# Patient Record
Sex: MALE | Race: WHITE | NOT HISPANIC OR LATINO | Employment: OTHER | URBAN - NONMETROPOLITAN AREA
[De-identification: names, ages, dates, MRNs, and addresses within clinical notes are randomized per-mention and may not be internally consistent; named-entity substitution may affect disease eponyms.]

---

## 2018-02-14 ENCOUNTER — HISTORICAL (OUTPATIENT)
Dept: ADMINISTRATIVE | Facility: HOSPITAL | Age: 83
End: 2018-02-14

## 2018-07-13 ENCOUNTER — HISTORICAL (OUTPATIENT)
Dept: ADMINISTRATIVE | Facility: HOSPITAL | Age: 83
End: 2018-07-13

## 2018-07-14 ENCOUNTER — HISTORICAL (OUTPATIENT)
Dept: ADMINISTRATIVE | Facility: HOSPITAL | Age: 83
End: 2018-07-14

## 2018-07-16 ENCOUNTER — HISTORICAL (OUTPATIENT)
Dept: ADMINISTRATIVE | Facility: HOSPITAL | Age: 83
End: 2018-07-16

## 2018-08-09 ENCOUNTER — HISTORICAL (OUTPATIENT)
Dept: ADMINISTRATIVE | Facility: HOSPITAL | Age: 83
End: 2018-08-09

## 2019-05-02 ENCOUNTER — HISTORICAL (OUTPATIENT)
Dept: ADMINISTRATIVE | Facility: HOSPITAL | Age: 84
End: 2019-05-02

## 2019-08-21 ENCOUNTER — HISTORICAL (OUTPATIENT)
Dept: ADMINISTRATIVE | Facility: HOSPITAL | Age: 84
End: 2019-08-21

## 2019-11-12 ENCOUNTER — HISTORICAL (OUTPATIENT)
Dept: ADMINISTRATIVE | Facility: HOSPITAL | Age: 84
End: 2019-11-12

## 2022-01-01 ENCOUNTER — HISTORICAL (OUTPATIENT)
Dept: ADMINISTRATIVE | Facility: HOSPITAL | Age: 87
End: 2022-01-01

## 2023-02-16 ENCOUNTER — OFFICE VISIT (OUTPATIENT)
Dept: FAMILY MEDICINE | Facility: CLINIC | Age: 88
End: 2023-02-16
Payer: COMMERCIAL

## 2023-02-16 VITALS
OXYGEN SATURATION: 98 % | TEMPERATURE: 97 F | SYSTOLIC BLOOD PRESSURE: 136 MMHG | HEIGHT: 72 IN | WEIGHT: 180 LBS | HEART RATE: 83 BPM | DIASTOLIC BLOOD PRESSURE: 86 MMHG | BODY MASS INDEX: 24.38 KG/M2

## 2023-02-16 DIAGNOSIS — J01.40 ACUTE NON-RECURRENT PANSINUSITIS: Primary | ICD-10-CM

## 2023-02-16 PROCEDURE — 3288F PR FALLS RISK ASSESSMENT DOCUMENTED: ICD-10-PCS | Mod: CPTII,,, | Performed by: NURSE PRACTITIONER

## 2023-02-16 PROCEDURE — 1159F PR MEDICATION LIST DOCUMENTED IN MEDICAL RECORD: ICD-10-PCS | Mod: CPTII,,, | Performed by: NURSE PRACTITIONER

## 2023-02-16 PROCEDURE — 3288F FALL RISK ASSESSMENT DOCD: CPT | Mod: CPTII,,, | Performed by: NURSE PRACTITIONER

## 2023-02-16 PROCEDURE — 1101F PT FALLS ASSESS-DOCD LE1/YR: CPT | Mod: CPTII,,, | Performed by: NURSE PRACTITIONER

## 2023-02-16 PROCEDURE — 99202 OFFICE O/P NEW SF 15 MIN: CPT | Mod: ,,, | Performed by: NURSE PRACTITIONER

## 2023-02-16 PROCEDURE — 1160F PR REVIEW ALL MEDS BY PRESCRIBER/CLIN PHARMACIST DOCUMENTED: ICD-10-PCS | Mod: CPTII,,, | Performed by: NURSE PRACTITIONER

## 2023-02-16 PROCEDURE — 99999 PR PBB SHADOW E&M-EST. PATIENT-LVL IV: CPT | Mod: PBBFAC,,, | Performed by: NURSE PRACTITIONER

## 2023-02-16 PROCEDURE — 99202 PR OFFICE/OUTPT VISIT, NEW, LEVL II, 15-29 MIN: ICD-10-PCS | Mod: ,,, | Performed by: NURSE PRACTITIONER

## 2023-02-16 PROCEDURE — 1160F RVW MEDS BY RX/DR IN RCRD: CPT | Mod: CPTII,,, | Performed by: NURSE PRACTITIONER

## 2023-02-16 PROCEDURE — 1159F MED LIST DOCD IN RCRD: CPT | Mod: CPTII,,, | Performed by: NURSE PRACTITIONER

## 2023-02-16 PROCEDURE — 99999 PR PBB SHADOW E&M-EST. PATIENT-LVL IV: ICD-10-PCS | Mod: PBBFAC,,, | Performed by: NURSE PRACTITIONER

## 2023-02-16 PROCEDURE — 1101F PR PT FALLS ASSESS DOC 0-1 FALLS W/OUT INJ PAST YR: ICD-10-PCS | Mod: CPTII,,, | Performed by: NURSE PRACTITIONER

## 2023-02-16 RX ORDER — FUROSEMIDE 20 MG/1
20 TABLET ORAL 2 TIMES DAILY
COMMUNITY
Start: 2022-12-30

## 2023-02-16 RX ORDER — SACUBITRIL AND VALSARTAN 97; 103 MG/1; MG/1
1 TABLET, FILM COATED ORAL 2 TIMES DAILY
COMMUNITY
Start: 2023-02-15

## 2023-02-16 RX ORDER — ATORVASTATIN CALCIUM 40 MG/1
40 TABLET, FILM COATED ORAL DAILY
COMMUNITY
Start: 2022-11-29

## 2023-02-16 RX ORDER — METOPROLOL SUCCINATE 50 MG/1
50 TABLET, EXTENDED RELEASE ORAL DAILY
COMMUNITY
Start: 2022-12-30

## 2023-02-16 RX ORDER — ALLOPURINOL 100 MG/1
100 TABLET ORAL DAILY
COMMUNITY
Start: 2023-01-12

## 2023-02-16 RX ORDER — AMOXICILLIN AND CLAVULANATE POTASSIUM 875; 125 MG/1; MG/1
1 TABLET, FILM COATED ORAL 2 TIMES DAILY
COMMUNITY
Start: 2023-02-13 | End: 2024-03-21 | Stop reason: ALTCHOICE

## 2023-02-16 RX ORDER — METHIMAZOLE 5 MG/1
5 TABLET ORAL DAILY
COMMUNITY
Start: 2022-12-19

## 2023-02-21 PROBLEM — J01.40 ACUTE NON-RECURRENT PANSINUSITIS: Status: ACTIVE | Noted: 2023-02-21

## 2023-02-22 NOTE — PROGRESS NOTES
Subjective:       Patient ID: Luis Felipe Puri is a 87 y.o. male.    Chief Complaint: Sinusitis    Sinusitis  This is a new problem. The current episode started in the past 7 days. The problem has been gradually worsening since onset. There has been no fever. He is experiencing no pain. Associated symptoms include congestion and sinus pressure. Pertinent negatives include no ear pain, headaches, sore throat or swollen glands. Past treatments include nasal decongestants. The treatment provided no relief.   Review of Systems   HENT:  Positive for nasal congestion and sinus pressure/congestion. Negative for ear pain and sore throat.    Neurological:  Negative for headaches.    See HPI    Objective:      Physical Exam   Constitutional: He is oriented to person, place, and time.  Non-toxic appearance.   HENT:   Head: Normocephalic and atraumatic.   Right Ear: Tympanic membrane, external ear and ear canal normal. No drainage or swelling. Tympanic membrane is not injected and not erythematous.   Left Ear: Tympanic membrane, external ear and ear canal normal. No drainage or swelling. Tympanic membrane is not injected and not erythematous.   Nose: Rhinorrhea and congestion present. Right sinus exhibits maxillary sinus tenderness and frontal sinus tenderness. Left sinus exhibits maxillary sinus tenderness and frontal sinus tenderness.   Mouth/Throat: Mucous membranes are moist. Oropharynx is clear.   Eyes: Pupils are equal, round, and reactive to light. Conjunctivae are normal.   Cardiovascular: Normal rate, regular rhythm and normal heart sounds.   Pulmonary/Chest: Effort normal and breath sounds normal.   Abdominal: Normal appearance.   Musculoskeletal:         General: Normal range of motion.      Cervical back: Normal range of motion and neck supple.   Neurological: He is alert and oriented to person, place, and time.   Skin: Skin is warm and dry.   Psychiatric: His behavior is normal. Mood, judgment and thought  content normal.   Nursing note and vitals reviewed.    Vitals:    02/16/23 1314   BP: 136/86   Pulse: 83   Temp: 97.3 °F (36.3 °C)       Assessment/Plan:     1. Acute non-recurrent pansinusitis    Discussed pros and cons of decongestants. Pt verbalized understanding.   Rx decongestant Ala-Hist sample x3 days.  Drink plenty of fluids.  RTC prn.

## 2023-05-29 PROBLEM — J01.40 ACUTE NON-RECURRENT PANSINUSITIS: Status: RESOLVED | Noted: 2023-02-21 | Resolved: 2023-05-29

## 2024-03-21 ENCOUNTER — HOSPITAL ENCOUNTER (INPATIENT)
Facility: HOSPITAL | Age: 89
LOS: 1 days | Discharge: HOME | DRG: 351 | End: 2024-03-23
Attending: EMERGENCY MEDICINE | Admitting: FAMILY MEDICINE
Payer: MEDICARE

## 2024-03-21 DIAGNOSIS — K56.609 SMALL BOWEL OBSTRUCTION: ICD-10-CM

## 2024-03-21 DIAGNOSIS — I50.9 CHF (CONGESTIVE HEART FAILURE): ICD-10-CM

## 2024-03-21 DIAGNOSIS — I42.9 CARDIOMYOPATHY: ICD-10-CM

## 2024-03-21 DIAGNOSIS — R07.9 CHEST PAIN: ICD-10-CM

## 2024-03-21 DIAGNOSIS — K40.90 RIGHT INGUINAL HERNIA: ICD-10-CM

## 2024-03-21 DIAGNOSIS — K40.30 UNILATERAL INGUINAL HERNIA WITH OBSTRUCTION AND WITHOUT GANGRENE, RECURRENCE NOT SPECIFIED: Primary | ICD-10-CM

## 2024-03-21 PROBLEM — I25.10 CORONARY ARTERY DISEASE: Status: ACTIVE | Noted: 2024-03-21

## 2024-03-21 PROBLEM — I25.5 ISCHEMIC CARDIOMYOPATHY: Status: ACTIVE | Noted: 2024-03-21

## 2024-03-21 PROBLEM — E05.90 HYPERTHYROIDISM: Status: ACTIVE | Noted: 2024-03-21

## 2024-03-21 PROBLEM — N17.9 ACUTE KIDNEY INJURY: Status: ACTIVE | Noted: 2024-03-21

## 2024-03-21 PROBLEM — E78.5 HYPERLIPIDEMIA: Status: ACTIVE | Noted: 2024-03-21

## 2024-03-21 PROBLEM — R73.9 HYPERGLYCEMIA: Status: ACTIVE | Noted: 2024-03-21

## 2024-03-21 PROBLEM — M10.9 GOUT: Status: ACTIVE | Noted: 2024-03-21

## 2024-03-21 PROBLEM — I16.0 HYPERTENSIVE URGENCY: Status: ACTIVE | Noted: 2024-03-21

## 2024-03-21 LAB
ALBUMIN SERPL-MCNC: 3.8 G/DL (ref 3.4–5)
ALBUMIN/GLOB SERPL: 1.2 RATIO
ALP SERPL-CCNC: 161 UNIT/L (ref 50–144)
ALT SERPL-CCNC: 41 UNIT/L (ref 1–45)
ANION GAP SERPL CALC-SCNC: 6 MEQ/L (ref 2–13)
APPEARANCE UR: CLEAR
AST SERPL-CCNC: 31 UNIT/L (ref 17–59)
BASOPHILS # BLD AUTO: 0.02 X10(3)/MCL (ref 0.01–0.08)
BASOPHILS NFR BLD AUTO: 0.3 % (ref 0.1–1.2)
BILIRUB SERPL-MCNC: 0.8 MG/DL (ref 0–1)
BILIRUB UR QL STRIP.AUTO: NEGATIVE
BUN SERPL-MCNC: 46 MG/DL (ref 7–20)
CALCIUM SERPL-MCNC: 9.3 MG/DL (ref 8.4–10.2)
CHLORIDE SERPL-SCNC: 110 MMOL/L (ref 98–110)
CO2 SERPL-SCNC: 23 MMOL/L (ref 21–32)
COLOR UR AUTO: YELLOW
CREAT SERPL-MCNC: 1.33 MG/DL (ref 0.66–1.25)
CREAT/UREA NIT SERPL: 35 (ref 12–20)
EOSINOPHIL # BLD AUTO: 0.06 X10(3)/MCL (ref 0.04–0.54)
EOSINOPHIL NFR BLD AUTO: 1 % (ref 0.7–7)
ERYTHROCYTE [DISTWIDTH] IN BLOOD BY AUTOMATED COUNT: 12.6 %
EST. AVERAGE GLUCOSE BLD GHB EST-MCNC: 191.5 MG/DL (ref 70–115)
GFR SERPLBLD CREATININE-BSD FMLA CKD-EPI: 51 MLS/MIN/1.73/M2
GLOBULIN SER-MCNC: 3.3 GM/DL (ref 2–3.9)
GLUCOSE SERPL-MCNC: 237 MG/DL (ref 70–115)
GLUCOSE UR QL STRIP.AUTO: 100
HBA1C MFR BLD: 8.3 % (ref 4–6)
HCT VFR BLD AUTO: 40 % (ref 36–52)
HGB BLD-MCNC: 13.1 G/DL (ref 13–18)
HYALINE CASTS URNS QL MICRO: ABNORMAL /LPF
IMM GRANULOCYTES # BLD AUTO: 0.01 X10(3)/MCL (ref 0–0.03)
IMM GRANULOCYTES NFR BLD AUTO: 0.2 % (ref 0–0.5)
KETONES UR QL STRIP.AUTO: NEGATIVE
LACTATE SERPL-SCNC: 1.2 MMOL/L (ref 0.4–2)
LEUKOCYTE ESTERASE UR QL STRIP.AUTO: NEGATIVE
LYMPHOCYTES # BLD AUTO: 1.09 X10(3)/MCL (ref 1.32–3.57)
LYMPHOCYTES NFR BLD AUTO: 18.9 % (ref 20–55)
MCH RBC QN AUTO: 30.1 PG (ref 27–34)
MCHC RBC AUTO-ENTMCNC: 32.8 G/DL (ref 31–37)
MCV RBC AUTO: 92 FL (ref 79–99)
MONOCYTES # BLD AUTO: 0.41 X10(3)/MCL (ref 0.3–0.82)
MONOCYTES NFR BLD AUTO: 7.1 % (ref 4.7–12.5)
NEUTROPHILS # BLD AUTO: 4.17 X10(3)/MCL (ref 1.78–5.38)
NEUTROPHILS NFR BLD AUTO: 72.5 % (ref 37–73)
NITRITE UR QL STRIP.AUTO: NEGATIVE
NRBC BLD AUTO-RTO: 0 %
PH UR STRIP.AUTO: 7 [PH]
PLATELET # BLD AUTO: 152 X10(3)/MCL (ref 140–371)
PMV BLD AUTO: 10.5 FL (ref 9.4–12.4)
POCT GLUCOSE: 139 MG/DL (ref 70–110)
POTASSIUM SERPL-SCNC: 4.6 MMOL/L (ref 3.5–5.1)
PROT SERPL-MCNC: 7.1 GM/DL (ref 6.3–8.2)
PROT UR QL STRIP.AUTO: 100
RBC # BLD AUTO: 4.35 X10(6)/MCL (ref 4–6)
RBC #/AREA URNS AUTO: ABNORMAL /HPF
RBC UR QL AUTO: ABNORMAL
SODIUM SERPL-SCNC: 139 MMOL/L (ref 135–145)
SP GR UR STRIP.AUTO: 1.01 (ref 1–1.03)
SQUAMOUS #/AREA URNS AUTO: ABNORMAL /HPF
UROBILINOGEN UR STRIP-ACNC: 0.2
WBC # SPEC AUTO: 5.76 X10(3)/MCL (ref 4–11.5)

## 2024-03-21 PROCEDURE — 96360 HYDRATION IV INFUSION INIT: CPT

## 2024-03-21 PROCEDURE — 83036 HEMOGLOBIN GLYCOSYLATED A1C: CPT | Performed by: INTERNAL MEDICINE

## 2024-03-21 PROCEDURE — 25500020 PHARM REV CODE 255: Performed by: EMERGENCY MEDICINE

## 2024-03-21 PROCEDURE — 83605 ASSAY OF LACTIC ACID: CPT | Performed by: EMERGENCY MEDICINE

## 2024-03-21 PROCEDURE — 80053 COMPREHEN METABOLIC PANEL: CPT | Performed by: EMERGENCY MEDICINE

## 2024-03-21 PROCEDURE — G0378 HOSPITAL OBSERVATION PER HR: HCPCS

## 2024-03-21 PROCEDURE — 99285 EMERGENCY DEPT VISIT HI MDM: CPT | Mod: 25

## 2024-03-21 PROCEDURE — 81001 URINALYSIS AUTO W/SCOPE: CPT | Performed by: EMERGENCY MEDICINE

## 2024-03-21 PROCEDURE — 25000003 PHARM REV CODE 250: Performed by: EMERGENCY MEDICINE

## 2024-03-21 PROCEDURE — 96372 THER/PROPH/DIAG INJ SC/IM: CPT | Performed by: INTERNAL MEDICINE

## 2024-03-21 PROCEDURE — 85025 COMPLETE CBC W/AUTO DIFF WBC: CPT | Performed by: EMERGENCY MEDICINE

## 2024-03-21 PROCEDURE — 82962 GLUCOSE BLOOD TEST: CPT

## 2024-03-21 PROCEDURE — 63600175 PHARM REV CODE 636 W HCPCS: Performed by: INTERNAL MEDICINE

## 2024-03-21 PROCEDURE — 25000003 PHARM REV CODE 250: Performed by: INTERNAL MEDICINE

## 2024-03-21 PROCEDURE — 96361 HYDRATE IV INFUSION ADD-ON: CPT

## 2024-03-21 RX ORDER — GLUCAGON 1 MG
1 KIT INJECTION
Status: DISCONTINUED | OUTPATIENT
Start: 2024-03-21 | End: 2024-03-23 | Stop reason: HOSPADM

## 2024-03-21 RX ORDER — FUROSEMIDE 20 MG/1
20 TABLET ORAL 2 TIMES DAILY
Status: DISCONTINUED | OUTPATIENT
Start: 2024-03-21 | End: 2024-03-23 | Stop reason: HOSPADM

## 2024-03-21 RX ORDER — INSULIN ASPART 100 [IU]/ML
0-5 INJECTION, SOLUTION INTRAVENOUS; SUBCUTANEOUS
Status: DISCONTINUED | OUTPATIENT
Start: 2024-03-21 | End: 2024-03-23 | Stop reason: HOSPADM

## 2024-03-21 RX ORDER — NALOXONE HCL 0.4 MG/ML
0.02 VIAL (ML) INJECTION
Status: DISCONTINUED | OUTPATIENT
Start: 2024-03-21 | End: 2024-03-23 | Stop reason: HOSPADM

## 2024-03-21 RX ORDER — IBUPROFEN 200 MG
24 TABLET ORAL
Status: DISCONTINUED | OUTPATIENT
Start: 2024-03-21 | End: 2024-03-23 | Stop reason: HOSPADM

## 2024-03-21 RX ORDER — TALC
6 POWDER (GRAM) TOPICAL NIGHTLY PRN
Status: DISCONTINUED | OUTPATIENT
Start: 2024-03-21 | End: 2024-03-23 | Stop reason: HOSPADM

## 2024-03-21 RX ORDER — ENOXAPARIN SODIUM 100 MG/ML
40 INJECTION SUBCUTANEOUS EVERY 24 HOURS
Status: DISCONTINUED | OUTPATIENT
Start: 2024-03-21 | End: 2024-03-23 | Stop reason: HOSPADM

## 2024-03-21 RX ORDER — ALLOPURINOL 100 MG/1
100 TABLET ORAL DAILY
Status: DISCONTINUED | OUTPATIENT
Start: 2024-03-22 | End: 2024-03-23 | Stop reason: HOSPADM

## 2024-03-21 RX ORDER — SODIUM CHLORIDE 9 MG/ML
1000 INJECTION, SOLUTION INTRAVENOUS CONTINUOUS
Status: DISCONTINUED | OUTPATIENT
Start: 2024-03-21 | End: 2024-03-21

## 2024-03-21 RX ORDER — SODIUM CHLORIDE 0.9 % (FLUSH) 0.9 %
10 SYRINGE (ML) INJECTION EVERY 12 HOURS PRN
Status: DISCONTINUED | OUTPATIENT
Start: 2024-03-21 | End: 2024-03-23 | Stop reason: HOSPADM

## 2024-03-21 RX ORDER — PROMETHAZINE HYDROCHLORIDE 25 MG/1
25 TABLET ORAL EVERY 6 HOURS PRN
Status: DISCONTINUED | OUTPATIENT
Start: 2024-03-21 | End: 2024-03-23 | Stop reason: HOSPADM

## 2024-03-21 RX ORDER — SODIUM CHLORIDE 9 MG/ML
1000 INJECTION, SOLUTION INTRAVENOUS CONTINUOUS
Status: ACTIVE | OUTPATIENT
Start: 2024-03-21 | End: 2024-03-22

## 2024-03-21 RX ORDER — ALUMINUM HYDROXIDE, MAGNESIUM HYDROXIDE, AND SIMETHICONE 1200; 120; 1200 MG/30ML; MG/30ML; MG/30ML
30 SUSPENSION ORAL 4 TIMES DAILY PRN
Status: DISCONTINUED | OUTPATIENT
Start: 2024-03-21 | End: 2024-03-23 | Stop reason: HOSPADM

## 2024-03-21 RX ORDER — ONDANSETRON HYDROCHLORIDE 2 MG/ML
4 INJECTION, SOLUTION INTRAVENOUS EVERY 8 HOURS PRN
Status: DISCONTINUED | OUTPATIENT
Start: 2024-03-21 | End: 2024-03-23 | Stop reason: HOSPADM

## 2024-03-21 RX ORDER — BISACODYL 10 MG/1
10 SUPPOSITORY RECTAL DAILY PRN
Status: DISCONTINUED | OUTPATIENT
Start: 2024-03-21 | End: 2024-03-23 | Stop reason: HOSPADM

## 2024-03-21 RX ORDER — METOPROLOL SUCCINATE 50 MG/1
50 TABLET, EXTENDED RELEASE ORAL DAILY
Status: DISCONTINUED | OUTPATIENT
Start: 2024-03-22 | End: 2024-03-23 | Stop reason: HOSPADM

## 2024-03-21 RX ORDER — METHIMAZOLE 5 MG/1
5 TABLET ORAL DAILY
Status: DISCONTINUED | OUTPATIENT
Start: 2024-03-22 | End: 2024-03-23 | Stop reason: HOSPADM

## 2024-03-21 RX ORDER — HYDROCODONE BITARTRATE AND ACETAMINOPHEN 5; 325 MG/1; MG/1
1 TABLET ORAL EVERY 6 HOURS PRN
Status: DISCONTINUED | OUTPATIENT
Start: 2024-03-21 | End: 2024-03-23 | Stop reason: HOSPADM

## 2024-03-21 RX ORDER — IBUPROFEN 200 MG
16 TABLET ORAL
Status: DISCONTINUED | OUTPATIENT
Start: 2024-03-21 | End: 2024-03-23 | Stop reason: HOSPADM

## 2024-03-21 RX ORDER — ATORVASTATIN CALCIUM 40 MG/1
40 TABLET, FILM COATED ORAL DAILY
Status: DISCONTINUED | OUTPATIENT
Start: 2024-03-22 | End: 2024-03-23 | Stop reason: HOSPADM

## 2024-03-21 RX ORDER — ACETAMINOPHEN 325 MG/1
650 TABLET ORAL EVERY 8 HOURS PRN
Status: DISCONTINUED | OUTPATIENT
Start: 2024-03-21 | End: 2024-03-23 | Stop reason: HOSPADM

## 2024-03-21 RX ADMIN — SODIUM CHLORIDE 1000 ML: 9 INJECTION, SOLUTION INTRAVENOUS at 03:03

## 2024-03-21 RX ADMIN — SACUBITRIL AND VALSARTAN 1 TABLET: 97; 103 TABLET, FILM COATED ORAL at 08:03

## 2024-03-21 RX ADMIN — SODIUM CHLORIDE 1000 ML: 9 INJECTION, SOLUTION INTRAVENOUS at 04:03

## 2024-03-21 RX ADMIN — IOHEXOL 93 ML: 300 INJECTION, SOLUTION INTRAVENOUS at 12:03

## 2024-03-21 RX ADMIN — SODIUM CHLORIDE 500 ML: 9 INJECTION, SOLUTION INTRAVENOUS at 12:03

## 2024-03-21 RX ADMIN — FUROSEMIDE 20 MG: 20 TABLET ORAL at 08:03

## 2024-03-21 RX ADMIN — ENOXAPARIN SODIUM 40 MG: 100 INJECTION SUBCUTANEOUS at 05:03

## 2024-03-21 NOTE — SUBJECTIVE & OBJECTIVE
Past Medical History:   Diagnosis Date    Anxiety     Bilateral inguinal hernia     CHF (congestive heart failure)     Coronary artery disease     Gout     Hyperlipidemia     Hypertension     Hyperthyroidism     Ischemic cardiomyopathy     Thyroid nodule        Past Surgical History:   Procedure Laterality Date    CHOLECYSTECTOMY      1990    CORONARY ANGIOPLASTY WITH STENT PLACEMENT      INGUINAL HERNIA REPAIR Left     KNEE ARTHROSCOPY Right     8/10/2018    RIGHT GREAT TOE AMPUTATION         Review of patient's allergies indicates:  No Known Allergies    No current facility-administered medications on file prior to encounter.     Current Outpatient Medications on File Prior to Encounter   Medication Sig    allopurinoL (ZYLOPRIM) 100 MG tablet Take 100 mg by mouth once daily.    atorvastatin (LIPITOR) 40 MG tablet Take 40 mg by mouth once daily.    ENTRESTO  mg per tablet Take 1 tablet by mouth 2 (two) times daily.    furosemide (LASIX) 20 MG tablet Take 20 mg by mouth 2 (two) times daily.    methIMAzole (TAPAZOLE) 5 MG Tab Take 5 mg by mouth once daily.    metoprolol succinate (TOPROL-XL) 50 MG 24 hr tablet Take 50 mg by mouth once daily.    [DISCONTINUED] amoxicillin-clavulanate 875-125mg (AUGMENTIN) 875-125 mg per tablet Take 1 tablet by mouth 2 (two) times daily.     Family History       Problem Relation (Age of Onset)    Cancer Mother    Ovarian cancer Sister    Pancreatic cancer Brother        Social History:   Tobacco Use    Smoking status: Never    Smokeless tobacco: Never   Substance and Sexual Activity    Alcohol use: Never    Drug use: Never    Sexual activity: Not on file     Review of Systems   Constitutional: Negative.    HENT: Negative.     Eyes: Negative.    Respiratory: Negative.     Cardiovascular: Negative.    Gastrointestinal:  Positive for nausea.   Endocrine: Negative.    Genitourinary: Negative.    Musculoskeletal: Negative.    Skin: Negative.    Allergic/Immunologic: Negative.     Neurological: Negative.    Hematological: Negative.    Psychiatric/Behavioral: Negative.       Objective:     Vital Signs (Most Recent):  Temp: 97.8 °F (36.6 °C) (03/21/24 1442)  Pulse: 63 (03/21/24 1442)  Resp: 17 (03/21/24 1442)  BP: (!) 168/72 (03/21/24 1442)  SpO2: 99 % (03/21/24 1442) Vital Signs (24h Range):  Temp:  [97.6 °F (36.4 °C)-97.8 °F (36.6 °C)] 97.8 °F (36.6 °C)  Pulse:  [61-82] 63  Resp:  [17-18] 17  SpO2:  [97 %-100 %] 99 %  BP: (135-241)/(69-99) 168/72     Weight: 86.1 kg (189 lb 13.1 oz)  Body mass index is 25.74 kg/m².     Physical Exam  General - Elderly  male in no acute distress.  HEENT -  NCAT. No scleral icterus. Oropharynx clear. Mucous membranes moist.  Neck - No lymphadenopathy, thyromegaly, or JVD.  CVS - Regular rate and rhythm. No murmurs, rubs, or gallops.  Resp - Lungs are clear to auscultation bilaterally. No rales, wheeze, or rhonchi.   GI - Soft, nontender, nondistended, normoactive bowel sounds present. Umbilical hernia present.   Extremities - No clubbing, cyanosis, or edema.   Neuro - CN II through XII are grossly intact. No focal neurological deficits. Alert and oriented times four.   Psych - Normal affect.  Skin - No rash, skin tear, or ulcer.    - Reduced right inguinal hernia.        Significant Labs: All pertinent labs within the past 24 hours have been reviewed.  CBC:   Recent Labs   Lab 03/21/24  1132   WBC 5.76   HGB 13.1   HCT 40.0        CMP:   Recent Labs   Lab 03/21/24  1132      K 4.6   CO2 23   BUN 46.0*   CREATININE 1.33*   CALCIUM 9.3   ALBUMIN 3.8   BILITOT 0.8   ALKPHOS 161*   AST 31   ALT 41       Urine Studies:   Recent Labs   Lab 03/21/24  1311   APPEARANCEUA Clear   PROTEINUA 100*   BILIRUBINUA Negative   UROBILINOGEN 0.2   LEUKOCYTESUR Negative   RBCUA 3-5       Significant Imaging: I have reviewed all pertinent imaging results/findings within the past 24 hours.    Imaging Results              CT Abdomen Pelvis With IV Contrast  NO Oral Contrast (Final result)  Result time 03/21/24 13:57:04      Final result by Nicholas Torres MD (03/21/24 13:57:04)                   Impression:        1. Early or partial close loop small bowel obstruction involving a right inguinal hernia as described above.    2.  Suspect developing fecal impaction in the rectosigmoid.    3.  12 cm fat filled umbilical hernia extending through a 4 cm opening involving the ventral wall of the right supraumbilical region.    4.  Suspect gynecomastia, left greater than right.    n/a    CATEGORY: n/a    The following dose reduction techniques are used for all CT at [facility]:    1.   Automated exposure control.    2.   Adjustment of the mA and/or kV according to patient size.    3.   Use of iterative reconstruction technique.      Electronically signed by: Nicholas Torres  Date:    03/21/2024  Time:    13:57               Narrative:    EXAMINATION:  CT ABDOMEN PELVIS WITH IV CONTRAST    CLINICAL HISTORY:  Hernia, complicated;    TECHNIQUE:  Low dose axial images, sagittal and coronal reformations were obtained from the lung bases to the pubic symphysis following the IV administration of 93 mL of Omnipaque 300 .  Oral contrast was not given.    COMPARISON:  02/14/2018    FINDINGS:  Liver:  No clinically significant abnormalities noted.    Gallbladder/Biliary System:  The gallbladder is not visualized and I suspect has been removed.    Spleen:  No clinically significant abnormalities noted.    Adrenal glands:  No clinically significant abnormalities noted.    Pancreas:  The pancreas is atrophic.    Kidneys/Urinary Tract:  Both kidneys demonstrate renal cortical thinning peer    Urinary bladder:  No clinically significant abnormalities noted.    Prostate gland/uterus and ovaries:  No clinically significant abnormalities noted.    GI tract:  A 12 cm fat filled hernia is present involving the right supra umbilical ventral wall and extends through an opening  measuring 4 cm in diameter.  A loop of distal ileum extends into a right inguinal hernia.  The opening or neck of the hernia is approximately 5 cm while the overall size of the hernia is approximately 4 x 7 cm.  Fluid is present within loops of small bowel in the pelvic region which contain a few air-fluid levels.  The portion of small bowel in the neck of the hernia is collapsed bowel the loop of bowel within the hernia sac shows mild dilatation and filled with fluid.  Collectively this pattern suggests a closed loop partial or early obstruction.  Significant fecal content is present in the rectosigmoid suspicious for a developing fecal impaction.    Vascular structures:  Moderate atherosclerotic calcification is present involving the aorta and its branches.    Musculoskeletal structures:  Moderate bony demineralization is present with moderate degenerative findings involving the spine and to a lesser extent the SI joints and the hips.    Miscellaneous:  No clinically significant abnormalities noted.

## 2024-03-21 NOTE — ASSESSMENT & PLAN NOTE
He denies any history of type II diabetes mellitus and a hemoglobin A1c will be ordered on admission. Continue SSI.

## 2024-03-21 NOTE — H&P
[facility] Medicine  Telehospitalist History & Physical    Patient Name: Luis Felipe Puri  MRN: [id number]  Patient Class: OP- Observation  Admission Date: 3/21/2024  Attending Physician: Ralf Ingram MD   Primary Care Provider: Shraddha Cardenas NP         Patient information was obtained from patient and ER records.     Subjective:     Principal Problem:Right inguinal hernia    Chief Complaint: Right groin pain.    HPI: Mr. Puri is a 88 year old  male with a history of CAD s/p PCI x 4, ischemic cardiomyopathy, HTN, and hyperlipidemia who presented to the ER today with acute onset of right groin pain. He was in his normal state of health until this morning at 4 am when he developed right groin pain 1/10 in severity. He reports nausea without vomiting and he denies any abdominal pain, dysuria, hematuria, urinary frequency, or flank pain. He denies any constipation or diarrhea and his most recent bowel movement was last night. His right groin pain increased to 10/10 in severity which is what prompted him to come to [facility] for further evaluation. He has a history of left inguinal hernia repair and he noticed a bulge in the right groin this morning. On arrival to the ER he was hypertensive with a blood pressure of 241/95 and his initial labwork was remarkable for a BUN of 46, creatinine of 1.33, glucose of 237. CT of the abdomen and pelvis showed an early or partial close loop small bowel obstruction involving a right inguinal hernia, suspected developing fecal impaction in the rectosigmoid, and a 12 cm fat filled umbilical hernia extending through a 4 cm opening involving the ventral wall of the right supraumbilical region. He has received normal saline 500 ml IV and his right inguinal hernia was reduced in the ER. He is otherwise in stable condition and he has no other complaints today.    Past Medical History:   Diagnosis Date     Anxiety     Bilateral inguinal hernia     CHF (congestive heart failure)     Coronary artery disease     Gout     Hyperlipidemia     Hypertension     Hyperthyroidism     Ischemic cardiomyopathy     Thyroid nodule        Past Surgical History:   Procedure Laterality Date    CHOLECYSTECTOMY      1990    CORONARY ANGIOPLASTY WITH STENT PLACEMENT      INGUINAL HERNIA REPAIR Left     KNEE ARTHROSCOPY Right     8/10/2018    RIGHT GREAT TOE AMPUTATION         Review of patient's allergies indicates:  No Known Allergies    No current facility-administered medications on file prior to encounter.     Current Outpatient Medications on File Prior to Encounter   Medication Sig    allopurinoL (ZYLOPRIM) 100 MG tablet Take 100 mg by mouth once daily.    atorvastatin (LIPITOR) 40 MG tablet Take 40 mg by mouth once daily.    ENTRESTO  mg per tablet Take 1 tablet by mouth 2 (two) times daily.    furosemide (LASIX) 20 MG tablet Take 20 mg by mouth 2 (two) times daily.    methIMAzole (TAPAZOLE) 5 MG Tab Take 5 mg by mouth once daily.    metoprolol succinate (TOPROL-XL) 50 MG 24 hr tablet Take 50 mg by mouth once daily.    [DISCONTINUED] amoxicillin-clavulanate 875-125mg (AUGMENTIN) 875-125 mg per tablet Take 1 tablet by mouth 2 (two) times daily.     Family History       Problem Relation (Age of Onset)    Cancer Mother    Ovarian cancer Sister    Pancreatic cancer Brother        Social History:   Tobacco Use    Smoking status: Never    Smokeless tobacco: Never   Substance and Sexual Activity    Alcohol use: Never    Drug use: Never    Sexual activity: Not on file     Review of Systems   Constitutional: Negative.    HENT: Negative.     Eyes: Negative.    Respiratory: Negative.     Cardiovascular: Negative.    Gastrointestinal:  Positive for nausea.   Endocrine: Negative.    Genitourinary: Positive for right groin pain.   Musculoskeletal: Negative.    Skin: Negative.    Allergic/Immunologic: Negative.    Neurological: Negative.     Hematological: Negative.    Psychiatric/Behavioral: Negative.       Objective:     Vital Signs (Most Recent):  Temp: 97.8 °F (36.6 °C) (03/21/24 1442)  Pulse: 63 (03/21/24 1442)  Resp: 17 (03/21/24 1442)  BP: (!) 168/72 (03/21/24 1442)  SpO2: 99 % (03/21/24 1442) Vital Signs (24h Range):  Temp:  [97.6 °F (36.4 °C)-97.8 °F (36.6 °C)] 97.8 °F (36.6 °C)  Pulse:  [61-82] 63  Resp:  [17-18] 17  SpO2:  [97 %-100 %] 99 %  BP: (135-241)/(69-99) 168/72     Weight: 86.1 kg (189 lb 13.1 oz)  Body mass index is 25.74 kg/m².     Physical Exam  General - Elderly  male in no acute distress.  HEENT -  NCAT. No scleral icterus. Oropharynx clear. Mucous membranes moist.  Neck - No lymphadenopathy, thyromegaly, or JVD.  CVS - Regular rate and rhythm. No murmurs, rubs, or gallops.  Resp - Lungs are clear to auscultation bilaterally. No rales, wheeze, or rhonchi.   GI - Soft, nontender, nondistended, normoactive bowel sounds present. Umbilical hernia present.   Extremities - No clubbing, cyanosis, or edema.   Neuro - CN II through XII are grossly intact. No focal neurological deficits. Alert and oriented times four.   Psych - Normal affect.  Skin - No rash, skin tear, or ulcer.    - Reduced right inguinal hernia.        Significant Labs: All pertinent labs within the past 24 hours have been reviewed.  CBC:   Recent Labs   Lab 03/21/24  1132   WBC 5.76   HGB 13.1   HCT 40.0        CMP:   Recent Labs   Lab 03/21/24  1132      K 4.6   CO2 23   BUN 46.0*   CREATININE 1.33*   CALCIUM 9.3   ALBUMIN 3.8   BILITOT 0.8   ALKPHOS 161*   AST 31   ALT 41       Urine Studies:   Recent Labs   Lab 03/21/24  1311   APPEARANCEUA Clear   PROTEINUA 100*   BILIRUBINUA Negative   UROBILINOGEN 0.2   LEUKOCYTESUR Negative   RBCUA 3-5       Significant Imaging: I have reviewed all pertinent imaging results/findings within the past 24 hours.    Imaging Results              CT Abdomen Pelvis With IV Contrast NO Oral Contrast (Final  result)  Result time 03/21/24 13:57:04      Final result by Nicholas Torres MD (03/21/24 13:57:04)                   Impression:        1. Early or partial close loop small bowel obstruction involving a right inguinal hernia as described above.    2.  Suspect developing fecal impaction in the rectosigmoid.    3.  12 cm fat filled umbilical hernia extending through a 4 cm opening involving the ventral wall of the right supraumbilical region.    4.  Suspect gynecomastia, left greater than right.    n/a    CATEGORY: n/a    The following dose reduction techniques are used for all CT at [facility]:    1.   Automated exposure control.    2.   Adjustment of the mA and/or kV according to patient size.    3.   Use of iterative reconstruction technique.      Electronically signed by: Nicholas Torres  Date:    03/21/2024  Time:    13:57               Narrative:    EXAMINATION:  CT ABDOMEN PELVIS WITH IV CONTRAST    CLINICAL HISTORY:  Hernia, complicated;    TECHNIQUE:  Low dose axial images, sagittal and coronal reformations were obtained from the lung bases to the pubic symphysis following the IV administration of 93 mL of Omnipaque 300 .  Oral contrast was not given.    COMPARISON:  02/14/2018    FINDINGS:  Liver:  No clinically significant abnormalities noted.    Gallbladder/Biliary System:  The gallbladder is not visualized and I suspect has been removed.    Spleen:  No clinically significant abnormalities noted.    Adrenal glands:  No clinically significant abnormalities noted.    Pancreas:  The pancreas is atrophic.    Kidneys/Urinary Tract:  Both kidneys demonstrate renal cortical thinning peer    Urinary bladder:  No clinically significant abnormalities noted.    Prostate gland/uterus and ovaries:  No clinically significant abnormalities noted.    GI tract:  A 12 cm fat filled hernia is present involving the right supra umbilical ventral wall and extends through an opening measuring 4 cm in  diameter.  A loop of distal ileum extends into a right inguinal hernia.  The opening or neck of the hernia is approximately 5 cm while the overall size of the hernia is approximately 4 x 7 cm.  Fluid is present within loops of small bowel in the pelvic region which contain a few air-fluid levels.  The portion of small bowel in the neck of the hernia is collapsed bowel the loop of bowel within the hernia sac shows mild dilatation and filled with fluid.  Collectively this pattern suggests a closed loop partial or early obstruction.  Significant fecal content is present in the rectosigmoid suspicious for a developing fecal impaction.    Vascular structures:  Moderate atherosclerotic calcification is present involving the aorta and its branches.    Musculoskeletal structures:  Moderate bony demineralization is present with moderate degenerative findings involving the spine and to a lesser extent the SI joints and the hips.    Miscellaneous:  No clinically significant abnormalities noted.                                      Assessment/Plan:     * Right inguinal hernia  Continue pain control. He will remain NPO until evaluated by general surgery.    Hypertensive urgency  Continue entresto, metoprolol succinate, and IV hydralazine as needed.    Acute kidney injury  Continue IV fluids. Avoid further nephrotoxins.    Hyperglycemia  He denies any history of type II diabetes mellitus and a hemoglobin A1c will be ordered on admission. Continue SSI.    Coronary artery disease  Continue cardiac regimen.     Gout  Continue allopurinol.    Hyperlipidemia  Continue atorvastatin.    Ischemic cardiomyopathy  Continue entresto.    Hyperthyroidism  Continue methimazole.    CHF (congestive heart failure)  Continue furosemide.       VTE Risk Mitigation (From admission, onward)           Ordered     enoxaparin injection 40 mg  Daily         03/21/24 1546     IP VTE HIGH RISK PATIENT  Once         03/21/24 1546     Place sequential  compression device  Until discontinued         03/21/24 1546                       On 03/21/2024, patient should be placed in hospital observation services under my care.      This service was provided via telemedicine.  Type of Software: Audio/Visual.  Originating Site: [facility].  Distant Site: [facility].  His exam was performed with the assistance of his ER nurse.       Augusto Rojas MD  Department of Hospital Medicine  [facility]

## 2024-03-21 NOTE — ED NOTES
EDWIN HARVEY  : 1935  MRN: [id number]  ConnectID: [id number]  REASON:  Admit: non-ICU  ACUITY:  10 Minutes  SUBMITTED:  2024 14:53 CDT  CLOSE

## 2024-03-21 NOTE — ED PROVIDER NOTES
Encounter Date: 3/21/2024       History     Chief Complaint   Patient presents with    Groin Pain     PRESENTS TO ED PER [facility] WITH C/O RT. GROIN PAIN AND NAUSEA SINCE LAST NIGHT. NON-REDUCIBLE HERNIA NOTED VIA RT. GROIN     Patient is an 88-year-old male who presents today with complaints of right lower abdominal pain that started today.  He reports feeling a bulge in that area at he did not notice before today.  He does have a history of abdominal hernias repaired bilaterally years ago.  No reports of vomiting.  He does report urinary frequency for years.  He does not have a primary care physician.  He does have a known history of high blood pressure, but did not take his medications this morning.       Review of patient's allergies indicates:  No Known Allergies  Past Medical History:   Diagnosis Date    Anxiety     Bilateral inguinal hernia     CHF (congestive heart failure)     Coronary artery disease     Gout     Hyperlipidemia     Hypertension     Hyperthyroidism     Ischemic cardiomyopathy     Thyroid nodule      Past Surgical History:   Procedure Laterality Date    CHOLECYSTECTOMY      1990    CORONARY ANGIOPLASTY WITH STENT PLACEMENT      INGUINAL HERNIA REPAIR Left     KNEE ARTHROSCOPY Right     8/10/2018    RIGHT GREAT TOE AMPUTATION       Family History   Problem Relation Age of Onset    Cancer Mother     Ovarian cancer Sister     Pancreatic cancer Brother      Social History     Tobacco Use    Smoking status: Never    Smokeless tobacco: Never   Substance Use Topics    Alcohol use: Never    Drug use: Never     Review of Systems   Gastrointestinal:  Positive for abdominal pain.        Hernia   Genitourinary:  Positive for frequency.   All other systems reviewed and are negative.      Physical Exam     Initial Vitals [03/21/24 1100]   BP Pulse Resp Temp SpO2   (!) 241/95 82 18 97.6 °F (36.4 °C) 98 %      MAP       --         Physical Exam    Nursing note and vitals reviewed.  Constitutional: He appears  well-developed and well-nourished. No distress.   HENT:   Head: Normocephalic and atraumatic.   Eyes: Conjunctivae and EOM are normal. Pupils are equal, round, and reactive to light.   Neck: Neck supple. No tracheal deviation present.   Cardiovascular:  Normal rate.           Pulmonary/Chest: Breath sounds normal. No respiratory distress.   Abdominal: Abdomen is soft. He exhibits no distension.   Tender, but reducible right lower abdominal hernia   Genitourinary:    Genitourinary Comments: No scrotal swelling or tenderness     Musculoskeletal:         General: No tenderness or edema. Normal range of motion.      Cervical back: Neck supple.     Neurological: He is alert and oriented to person, place, and time. GCS score is 15. GCS eye subscore is 4. GCS verbal subscore is 5. GCS motor subscore is 6.   No focal deficits   Skin: Skin is warm. No rash noted. No erythema.   Psychiatric: He has a normal mood and affect. His behavior is normal.         ED Course   Procedures  Labs Reviewed   COMPREHENSIVE METABOLIC PANEL - Abnormal; Notable for the following components:       Result Value    Glucose Level 237 (*)     Blood Urea Nitrogen 46.0 (*)     Creatinine 1.33 (*)     Alkaline Phosphatase 161 (*)     BUN/Creatinine Ratio 35 (*)     All other components within normal limits   URINALYSIS, REFLEX TO URINE CULTURE - Abnormal; Notable for the following components:    Protein,  (*)     Glucose,  (*)     Blood, UA Small (*)     All other components within normal limits    Narrative:      URINE STABILITY IS 2 HOURS AT ROOM TEMP OR    SIX HOURS REFRIGERATED. PERFORMING TESTING ON    SPECIMENS GREATER THAN THIS AGE MAY AFFECT THE    FOLLOWING TESTS:    PH          SPECIFIC GRAVITY           BLOOD    CLARITY     BILIRUBIN               UROBILINOGEN   CBC WITH DIFFERENTIAL - Abnormal; Notable for the following components:    Lymph % 18.9 (*)     Lymph # 1.09 (*)     All other components within normal limits    URINALYSIS, MICROSCOPIC - Abnormal; Notable for the following components:    Hyaline Casts, UA Rare (*)     All other components within normal limits   LACTIC ACID, PLASMA - Normal   CBC W/ AUTO DIFFERENTIAL    Narrative:     The following orders were created for panel order CBC auto differential.  Procedure                               Abnormality         Status                     ---------                               -----------         ------                     CBC with Differential[[id number]]       Abnormal            Final result                 Please view results for these tests on the individual orders.     Results for orders placed or performed during the hospital encounter of 03/21/24   Comprehensive metabolic panel   Result Value Ref Range    Sodium Level 139 135 - 145 mmol/L    Potassium Level 4.6 3.5 - 5.1 mmol/L    Chloride 110 98 - 110 mmol/L    Carbon Dioxide 23 21 - 32 mmol/L    Glucose Level 237 (H) 70 - 115 mg/dL    Blood Urea Nitrogen 46.0 (H) 7.0 - 20.0 mg/dL    Creatinine 1.33 (H) 0.66 - 1.25 mg/dL    Calcium Level Total 9.3 8.4 - 10.2 mg/dL    Protein Total 7.1 6.3 - 8.2 gm/dL    Albumin Level 3.8 3.4 - 5.0 g/dL    Globulin 3.3 2.0 - 3.9 gm/dL    Albumin/Globulin Ratio 1.2 ratio    Bilirubin Total 0.8 0.0 - 1.0 mg/dL    Alkaline Phosphatase 161 (H) 50 - 144 unit/L    Alanine Aminotransferase 41 1 - 45 unit/L    Aspartate Aminotransferase 31 17 - 59 unit/L    eGFR 51 mls/min/1.73/m2    Anion Gap 6.0 2.0 - 13.0 mEq/L    BUN/Creatinine Ratio 35 (H) 12 - 20   Urinalysis, Reflex to Urine Culture    Specimen: Urine   Result Value Ref Range    Color, UA Yellow Yellow, Light-Yellow, Dark Yellow, Analia, Straw    Appearance, UA Clear Clear    Specific Gravity, UA 1.015 1.005 - 1.030    pH, UA 7.0 5.0 - 8.5    Protein,  (A) Negative    Glucose,  (A) Negative, Normal    Ketones, UA Negative Negative    Blood, UA Small (A) Negative    Bilirubin, UA Negative Negative    Urobilinogen, UA  0.2 0.2, 1.0, Normal    Nitrites, UA Negative Negative    Leukocyte Esterase, UA Negative Negative   CBC with Differential   Result Value Ref Range    WBC 5.76 4.00 - 11.50 x10(3)/mcL    RBC 4.35 4.00 - 6.00 x10(6)/mcL    Hgb 13.1 13.0 - 18.0 g/dL    Hct 40.0 36.0 - 52.0 %    MCV 92.0 79.0 - 99.0 fL    MCH 30.1 27.0 - 34.0 pg    MCHC 32.8 31.0 - 37.0 g/dL    RDW 12.6 %    Platelet 152 140 - 371 x10(3)/mcL    MPV 10.5 9.4 - 12.4 fL    Neut % 72.5 37 - 73 %    Lymph % 18.9 (L) 20 - 55 %    Mono % 7.1 4.7 - 12.5 %    Eos % 1.0 0.7 - 7 %    Basophil % 0.3 0.1 - 1.2 %    Lymph # 1.09 (L) 1.32 - 3.57 x10(3)/mcL    Neut # 4.17 1.78 - 5.38 x10(3)/mcL    Mono # 0.41 0.3 - 0.82 x10(3)/mcL    Eos # 0.06 0.04 - 0.54 x10(3)/mcL    Baso # 0.02 0.01 - 0.08 x10(3)/mcL    IG# 0.01 0.0001 - 0.031 x10(3)/mcL    IG% 0.2 0 - 0.5 %    NRBC% 0.0 <=1 %   Lactic acid, plasma   Result Value Ref Range    Lactic Acid Level 1.2 0.4 - 2.0 mmol/L   Urinalysis, Microscopic   Result Value Ref Range    Hyaline Casts, UA Rare (A) None Seen /LPF    RBC, UA 3-5 None Seen, 0-2, 3-5, 0-5 /HPF    Squamous Epithelial Cells, UA Rare None Seen, Rare, Occasional, Occ /HPF            Imaging Results              CT Abdomen Pelvis With IV Contrast NO Oral Contrast (Final result)  Result time 03/21/24 13:57:04      Final result by Nicholas Torres MD (03/21/24 13:57:04)                   Impression:        1. Early or partial close loop small bowel obstruction involving a right inguinal hernia as described above.    2.  Suspect developing fecal impaction in the rectosigmoid.    3.  12 cm fat filled umbilical hernia extending through a 4 cm opening involving the ventral wall of the right supraumbilical region.    4.  Suspect gynecomastia, left greater than right.    n/a    CATEGORY: n/a    The following dose reduction techniques are used for all CT at [facility]:    1.   Automated exposure control.    2.   Adjustment of the mA and/or kV  according to patient size.    3.   Use of iterative reconstruction technique.      Electronically signed by: Nicholas Torres  Date:    03/21/2024  Time:    13:57               Narrative:    EXAMINATION:  CT ABDOMEN PELVIS WITH IV CONTRAST    CLINICAL HISTORY:  Hernia, complicated;    TECHNIQUE:  Low dose axial images, sagittal and coronal reformations were obtained from the lung bases to the pubic symphysis following the IV administration of 93 mL of Omnipaque 300 .  Oral contrast was not given.    COMPARISON:  02/14/2018    FINDINGS:  Liver:  No clinically significant abnormalities noted.    Gallbladder/Biliary System:  The gallbladder is not visualized and I suspect has been removed.    Spleen:  No clinically significant abnormalities noted.    Adrenal glands:  No clinically significant abnormalities noted.    Pancreas:  The pancreas is atrophic.    Kidneys/Urinary Tract:  Both kidneys demonstrate renal cortical thinning peer    Urinary bladder:  No clinically significant abnormalities noted.    Prostate gland/uterus and ovaries:  No clinically significant abnormalities noted.    GI tract:  A 12 cm fat filled hernia is present involving the right supra umbilical ventral wall and extends through an opening measuring 4 cm in diameter.  A loop of distal ileum extends into a right inguinal hernia.  The opening or neck of the hernia is approximately 5 cm while the overall size of the hernia is approximately 4 x 7 cm.  Fluid is present within loops of small bowel in the pelvic region which contain a few air-fluid levels.  The portion of small bowel in the neck of the hernia is collapsed bowel the loop of bowel within the hernia sac shows mild dilatation and filled with fluid.  Collectively this pattern suggests a closed loop partial or early obstruction.  Significant fecal content is present in the rectosigmoid suspicious for a developing fecal impaction.    Vascular structures:  Moderate atherosclerotic calcification is  present involving the aorta and its branches.    Musculoskeletal structures:  Moderate bony demineralization is present with moderate degenerative findings involving the spine and to a lesser extent the SI joints and the hips.    Miscellaneous:  No clinically significant abnormalities noted.                                       Medications   0.9%  NaCl infusion (1,000 mLs Intravenous New Bag 3/21/24 1545)   sodium chloride 0.9% bolus 500 mL 500 mL (0 mLs Intravenous Stopped 3/21/24 1408)   iohexoL (OMNIPAQUE 300) injection 93 mL (93 mLs Intravenous Given 3/21/24 1248)     Medical Decision Making  88-year-old male presented to the emergency department with complaints of acute right lower abdominal pain associated with the hernia.  He had not noticed the hernia prior to today.  On initial exam, I was able to reduce a significant amount of the hernia.  Patient's pain resolved.  He was sent to CT.  CT showed fat filled hernia associated with findings concerning for a early bowel obstruction.  Consulted general surgery Dr. Restrepo, who asked that we start IV fluids, keep NPO, admit. I consulted Hospital medicine Dr. Rojas, who is agreeable to admission    Amount and/or Complexity of Data Reviewed  External Data Reviewed: notes.     Details: Past medical history, medications, and allergies reviewed  Labs: ordered. Decision-making details documented in ED Course.  Radiology: ordered and independent interpretation performed. Decision-making details documented in ED Course.    Risk  Prescription drug management.  Decision regarding hospitalization.  Emergency major surgery.                                      Clinical Impression:  Final diagnoses:  [K40.30] Unilateral inguinal hernia with obstruction and without gangrene, recurrence not specified (Primary)  [K56.609] Small bowel obstruction          ED Disposition Condition    Admit Joshua Lewis MD  03/21/24 [id number]

## 2024-03-21 NOTE — HPI
Mr. Puri is a 88 year old  male with a history of CAD s/p PCI x 4, ischemic cardiomyopathy, HTN, and hyperlipidemia who presented to the ER today with acute onset of right groin pain. He was in his normal state of health until this morning at 4 am when he developed right groin pain 1/10 in severity. He reports nausea without vomiting and he denies any abdominal pain, dysuria, hematuria, urinary frequency, or flank pain. He denies any constipation or diarrhea and his most recent bowel movement was last night. His right groin pain increased to 10/10 in severity which is what prompted him to come to [facility] for further evaluation. He has a history of left inguinal hernia repair and he noticed a bulge in the right groin this morning. On arrival to the ER he was hypertensive with a blood pressure of 241/95 and his initial labwork was remarkable for a BUN of 46, creatinine of 1.33, glucose of 237. CT of the abdomen and pelvis showed an early or partial close loop small bowel obstruction involving a right inguinal hernia, suspected developing fecal impaction in the rectosigmoid, and a 12 cm fat filled umbilical hernia extending through a 4 cm opening involving the ventral wall of the right supraumbilical region. He has received normal saline 500 ml IV and his right inguinal hernia was reduced in the ER. He is otherwise in stable condition and he has no other complaints today.

## 2024-03-22 ENCOUNTER — ANESTHESIA (OUTPATIENT)
Dept: SURGERY | Facility: HOSPITAL | Age: 89
DRG: 351 | End: 2024-03-22
Payer: MEDICARE

## 2024-03-22 ENCOUNTER — ANESTHESIA EVENT (OUTPATIENT)
Dept: SURGERY | Facility: HOSPITAL | Age: 89
DRG: 351 | End: 2024-03-22
Payer: MEDICARE

## 2024-03-22 PROBLEM — K40.30 UNILATERAL INGUINAL HERNIA WITH OBSTRUCTION AND WITHOUT GANGRENE: Status: ACTIVE | Noted: 2024-03-22

## 2024-03-22 LAB
ALBUMIN SERPL-MCNC: 3.3 G/DL (ref 3.4–5)
ALBUMIN/GLOB SERPL: 1.2 RATIO
ALP SERPL-CCNC: 137 UNIT/L (ref 50–144)
ALT SERPL-CCNC: 34 UNIT/L (ref 1–45)
ANION GAP SERPL CALC-SCNC: 6 MEQ/L (ref 2–13)
AORTIC VALVE CUSP SEPERATION: 1.35 CM
AST SERPL-CCNC: 30 UNIT/L (ref 17–59)
AV INDEX (PROSTH): 0.61
AV MEAN GRADIENT: 6 MMHG
AV PEAK GRADIENT: 10 MMHG
AV REGURGITATION PRESSURE HALF TIME: 420 MS
AV VALVE AREA BY VELOCITY RATIO: 1.88 CM²
AV VALVE AREA: 2.05 CM²
AV VELOCITY RATIO: 0.56
BASOPHILS # BLD AUTO: 0.02 X10(3)/MCL (ref 0.01–0.08)
BASOPHILS NFR BLD AUTO: 0.4 % (ref 0.1–1.2)
BILIRUB SERPL-MCNC: 0.8 MG/DL (ref 0–1)
BSA FOR ECHO PROCEDURE: 2.09 M2
BUN SERPL-MCNC: 37 MG/DL (ref 7–20)
CALCIUM SERPL-MCNC: 8.7 MG/DL (ref 8.4–10.2)
CHLORIDE SERPL-SCNC: 112 MMOL/L (ref 98–110)
CO2 SERPL-SCNC: 22 MMOL/L (ref 21–32)
CREAT SERPL-MCNC: 1.16 MG/DL (ref 0.66–1.25)
CREAT/UREA NIT SERPL: 32 (ref 12–20)
CV ECHO LV RWT: 0.38 CM
DOP CALC AO PEAK VEL: 1.61 M/S
DOP CALC AO VTI: 37.8 CM
DOP CALC LVOT AREA: 3.4 CM2
DOP CALC LVOT DIAMETER: 2.07 CM
DOP CALC LVOT PEAK VEL: 0.9 M/S
DOP CALC LVOT STROKE VOLUME: 77.36 CM3
DOP CALCLVOT PEAK VEL VTI: 23 CM
E WAVE DECELERATION TIME: 367 MSEC
ECHO LV POSTERIOR WALL: 0.95 CM (ref 0.6–1.1)
EOSINOPHIL # BLD AUTO: 0.07 X10(3)/MCL (ref 0.04–0.54)
EOSINOPHIL NFR BLD AUTO: 1.4 % (ref 0.7–7)
ERYTHROCYTE [DISTWIDTH] IN BLOOD BY AUTOMATED COUNT: 12.5 %
FRACTIONAL SHORTENING: 15 % (ref 28–44)
GFR SERPLBLD CREATININE-BSD FMLA CKD-EPI: 61 MLS/MIN/1.73/M2
GLOBULIN SER-MCNC: 2.8 GM/DL (ref 2–3.9)
GLUCOSE SERPL-MCNC: 135 MG/DL (ref 70–115)
HCT VFR BLD AUTO: 36.3 % (ref 36–52)
HGB BLD-MCNC: 11.9 G/DL (ref 13–18)
IMM GRANULOCYTES # BLD AUTO: 0.01 X10(3)/MCL (ref 0–0.03)
IMM GRANULOCYTES NFR BLD AUTO: 0.2 % (ref 0–0.5)
INTERVENTRICULAR SEPTUM: 0.94 CM (ref 0.6–1.1)
LEFT ATRIUM SIZE: 3.9 CM
LEFT ATRIUM VOLUME INDEX MOD: 26.8 ML/M2
LEFT ATRIUM VOLUME MOD: 55.7 CM3
LEFT INTERNAL DIMENSION IN SYSTOLE: 4.22 CM (ref 2.1–4)
LEFT VENTRICLE DIASTOLIC VOLUME INDEX: 56.59 ML/M2
LEFT VENTRICLE DIASTOLIC VOLUME: 117.7 ML
LEFT VENTRICLE MASS INDEX: 81 G/M2
LEFT VENTRICLE SYSTOLIC VOLUME INDEX: 38.2 ML/M2
LEFT VENTRICLE SYSTOLIC VOLUME: 79.5 ML
LEFT VENTRICULAR INTERNAL DIMENSION IN DIASTOLE: 4.99 CM (ref 3.5–6)
LEFT VENTRICULAR MASS: 168.17 G
LVOT MG: 1.8 MMHG
LVOT MV: 0.65 CM/S
LYMPHOCYTES # BLD AUTO: 1.82 X10(3)/MCL (ref 1.32–3.57)
LYMPHOCYTES NFR BLD AUTO: 36.1 % (ref 20–55)
MCH RBC QN AUTO: 30.1 PG (ref 27–34)
MCHC RBC AUTO-ENTMCNC: 32.8 G/DL (ref 31–37)
MCV RBC AUTO: 91.7 FL (ref 79–99)
MONOCYTES # BLD AUTO: 0.41 X10(3)/MCL (ref 0.3–0.82)
MONOCYTES NFR BLD AUTO: 8.1 % (ref 4.7–12.5)
MV PEAK A VEL: 0.6 M/S
NEUTROPHILS # BLD AUTO: 2.71 X10(3)/MCL (ref 1.78–5.38)
NEUTROPHILS NFR BLD AUTO: 53.8 % (ref 37–73)
NRBC BLD AUTO-RTO: 0 %
PISA AR MAX VEL: 1.99 M/S
PISA TR MAX VEL: 1.34 M/S
PLATELET # BLD AUTO: 147 X10(3)/MCL (ref 140–371)
PMV BLD AUTO: 10.3 FL (ref 9.4–12.4)
POCT GLUCOSE: 125 MG/DL (ref 70–110)
POCT GLUCOSE: 127 MG/DL (ref 70–110)
POCT GLUCOSE: 136 MG/DL (ref 70–110)
POCT GLUCOSE: 143 MG/DL (ref 70–110)
POTASSIUM SERPL-SCNC: 3.8 MMOL/L (ref 3.5–5.1)
PROT SERPL-MCNC: 6.1 GM/DL (ref 6.3–8.2)
RBC # BLD AUTO: 3.96 X10(6)/MCL (ref 4–6)
SODIUM SERPL-SCNC: 140 MMOL/L (ref 135–145)
TDI LATERAL: 0.1 M/S
TDI SEPTAL: 0.08 M/S
TDI: 0.09 M/S
TR MAX PG: 7 MMHG
TRICUSPID ANNULAR PLANE SYSTOLIC EXCURSION: 1.86 CM
WBC # SPEC AUTO: 5.04 X10(3)/MCL (ref 4–11.5)
Z-SCORE OF LEFT VENTRICULAR DIMENSION IN END DIASTOLE: -2.44
Z-SCORE OF LEFT VENTRICULAR DIMENSION IN END SYSTOLE: 0.63

## 2024-03-22 PROCEDURE — 25000003 PHARM REV CODE 250: Performed by: INTERNAL MEDICINE

## 2024-03-22 PROCEDURE — C1781 MESH (IMPLANTABLE): HCPCS | Performed by: SURGERY

## 2024-03-22 PROCEDURE — 25000003 PHARM REV CODE 250: Performed by: SURGERY

## 2024-03-22 PROCEDURE — 80053 COMPREHEN METABOLIC PANEL: CPT | Performed by: INTERNAL MEDICINE

## 2024-03-22 PROCEDURE — 36000709 HC OR TIME LEV III EA ADD 15 MIN: Performed by: SURGERY

## 2024-03-22 PROCEDURE — 63600175 PHARM REV CODE 636 W HCPCS: Performed by: SURGERY

## 2024-03-22 PROCEDURE — 71000033 HC RECOVERY, INTIAL HOUR: Performed by: SURGERY

## 2024-03-22 PROCEDURE — 37000009 HC ANESTHESIA EA ADD 15 MINS: Performed by: SURGERY

## 2024-03-22 PROCEDURE — 11000001 HC ACUTE MED/SURG PRIVATE ROOM

## 2024-03-22 PROCEDURE — 27201423 OPTIME MED/SURG SUP & DEVICES STERILE SUPPLY: Performed by: SURGERY

## 2024-03-22 PROCEDURE — 36415 COLL VENOUS BLD VENIPUNCTURE: CPT | Performed by: INTERNAL MEDICINE

## 2024-03-22 PROCEDURE — D9220A PRA ANESTHESIA: Mod: ,,, | Performed by: NURSE ANESTHETIST, CERTIFIED REGISTERED

## 2024-03-22 PROCEDURE — 36000708 HC OR TIME LEV III 1ST 15 MIN: Performed by: SURGERY

## 2024-03-22 PROCEDURE — 93010 ELECTROCARDIOGRAM REPORT: CPT | Mod: ,,, | Performed by: INTERNAL MEDICINE

## 2024-03-22 PROCEDURE — 37000008 HC ANESTHESIA 1ST 15 MINUTES: Performed by: SURGERY

## 2024-03-22 PROCEDURE — C1727 CATH, BAL TIS DIS, NON-VAS: HCPCS | Performed by: SURGERY

## 2024-03-22 PROCEDURE — 93005 ELECTROCARDIOGRAM TRACING: CPT

## 2024-03-22 PROCEDURE — 25000003 PHARM REV CODE 250: Performed by: NURSE ANESTHETIST, CERTIFIED REGISTERED

## 2024-03-22 PROCEDURE — 63600175 PHARM REV CODE 636 W HCPCS: Performed by: NURSE ANESTHETIST, CERTIFIED REGISTERED

## 2024-03-22 PROCEDURE — 85025 COMPLETE CBC W/AUTO DIFF WBC: CPT | Performed by: INTERNAL MEDICINE

## 2024-03-22 PROCEDURE — 0YU54JZ SUPPLEMENT RIGHT INGUINAL REGION WITH SYNTHETIC SUBSTITUTE, PERCUTANEOUS ENDOSCOPIC APPROACH: ICD-10-PCS | Performed by: SURGERY

## 2024-03-22 PROCEDURE — C1726 CATH, BAL DIL, NON-VASCULAR: HCPCS | Performed by: SURGERY

## 2024-03-22 DEVICE — BARD 3DMAX MESH RIGHT LARGE
Type: IMPLANTABLE DEVICE | Site: INGUINAL | Status: FUNCTIONAL
Brand: BARD 3DMAX MESH

## 2024-03-22 RX ORDER — MIDAZOLAM HYDROCHLORIDE 1 MG/ML
1 INJECTION INTRAMUSCULAR; INTRAVENOUS
Status: COMPLETED | OUTPATIENT
Start: 2024-03-22 | End: 2024-03-22

## 2024-03-22 RX ORDER — LIDOCAINE HYDROCHLORIDE 10 MG/ML
INJECTION, SOLUTION EPIDURAL; INFILTRATION; INTRACAUDAL; PERINEURAL
Status: DISCONTINUED | OUTPATIENT
Start: 2024-03-22 | End: 2024-03-22 | Stop reason: HOSPADM

## 2024-03-22 RX ORDER — HYDROMORPHONE HYDROCHLORIDE 1 MG/ML
0.25 INJECTION, SOLUTION INTRAMUSCULAR; INTRAVENOUS; SUBCUTANEOUS EVERY 6 HOURS PRN
Status: DISCONTINUED | OUTPATIENT
Start: 2024-03-22 | End: 2024-03-22

## 2024-03-22 RX ORDER — ACETAMINOPHEN 10 MG/ML
1000 INJECTION, SOLUTION INTRAVENOUS EVERY 8 HOURS
Status: COMPLETED | OUTPATIENT
Start: 2024-03-22 | End: 2024-03-23

## 2024-03-22 RX ORDER — HYDROMORPHONE HYDROCHLORIDE 1 MG/ML
0.25 INJECTION, SOLUTION INTRAMUSCULAR; INTRAVENOUS; SUBCUTANEOUS EVERY 4 HOURS PRN
Status: DISCONTINUED | OUTPATIENT
Start: 2024-03-22 | End: 2024-03-23 | Stop reason: HOSPADM

## 2024-03-22 RX ORDER — SODIUM CHLORIDE 9 MG/ML
INJECTION, SOLUTION INTRAVENOUS ONCE
Status: COMPLETED | OUTPATIENT
Start: 2024-03-22 | End: 2024-03-22

## 2024-03-22 RX ORDER — FAMOTIDINE 20 MG/1
20 TABLET, FILM COATED ORAL
Status: DISCONTINUED | OUTPATIENT
Start: 2024-03-22 | End: 2024-03-23 | Stop reason: HOSPADM

## 2024-03-22 RX ORDER — SODIUM CHLORIDE 450 MG/100ML
INJECTION, SOLUTION INTRAVENOUS CONTINUOUS
Status: DISCONTINUED | OUTPATIENT
Start: 2024-03-22 | End: 2024-03-23 | Stop reason: HOSPADM

## 2024-03-22 RX ORDER — TRANEXAMIC ACID 100 MG/ML
INJECTION, SOLUTION INTRAVENOUS
Status: DISCONTINUED | OUTPATIENT
Start: 2024-03-22 | End: 2024-03-22

## 2024-03-22 RX ORDER — LABETALOL HYDROCHLORIDE 5 MG/ML
15 INJECTION, SOLUTION INTRAVENOUS ONCE
Status: COMPLETED | OUTPATIENT
Start: 2024-03-22 | End: 2024-03-22

## 2024-03-22 RX ORDER — SODIUM CHLORIDE 9 MG/ML
INJECTION, SOLUTION INTRAVENOUS ONCE
Status: DISCONTINUED | OUTPATIENT
Start: 2024-03-22 | End: 2024-03-22

## 2024-03-22 RX ORDER — EPHEDRINE SULFATE 50 MG/ML
INJECTION, SOLUTION INTRAVENOUS
Status: DISCONTINUED | OUTPATIENT
Start: 2024-03-22 | End: 2024-03-22

## 2024-03-22 RX ORDER — VECURONIUM BROMIDE FOR INJECTION 1 MG/ML
INJECTION, POWDER, LYOPHILIZED, FOR SOLUTION INTRAVENOUS
Status: DISCONTINUED | OUTPATIENT
Start: 2024-03-22 | End: 2024-03-22

## 2024-03-22 RX ORDER — FENTANYL CITRATE 50 UG/ML
INJECTION, SOLUTION INTRAMUSCULAR; INTRAVENOUS
Status: DISCONTINUED | OUTPATIENT
Start: 2024-03-22 | End: 2024-03-22

## 2024-03-22 RX ORDER — BUPIVACAINE HYDROCHLORIDE 5 MG/ML
INJECTION, SOLUTION EPIDURAL; INTRACAUDAL
Status: DISCONTINUED | OUTPATIENT
Start: 2024-03-22 | End: 2024-03-22 | Stop reason: HOSPADM

## 2024-03-22 RX ORDER — LABETALOL HYDROCHLORIDE 5 MG/ML
10 INJECTION, SOLUTION INTRAVENOUS ONCE
Status: COMPLETED | OUTPATIENT
Start: 2024-03-22 | End: 2024-03-22

## 2024-03-22 RX ORDER — KETOROLAC TROMETHAMINE 30 MG/ML
INJECTION, SOLUTION INTRAMUSCULAR; INTRAVENOUS
Status: DISCONTINUED | OUTPATIENT
Start: 2024-03-22 | End: 2024-03-22

## 2024-03-22 RX ADMIN — LABETALOL HYDROCHLORIDE 10 MG: 5 INJECTION, SOLUTION INTRAVENOUS at 07:03

## 2024-03-22 RX ADMIN — FUROSEMIDE 20 MG: 20 TABLET ORAL at 09:03

## 2024-03-22 RX ADMIN — SACUBITRIL AND VALSARTAN 1 TABLET: 97; 103 TABLET, FILM COATED ORAL at 09:03

## 2024-03-22 RX ADMIN — LABETALOL HYDROCHLORIDE 15 MG: 5 INJECTION, SOLUTION INTRAVENOUS at 06:03

## 2024-03-22 RX ADMIN — SODIUM CHLORIDE: 9 INJECTION, SOLUTION INTRAVENOUS at 04:03

## 2024-03-22 RX ADMIN — SUGAMMADEX 200 MG: 100 INJECTION, SOLUTION INTRAVENOUS at 06:03

## 2024-03-22 RX ADMIN — EPHEDRINE SULFATE 10 MG: 50 INJECTION, SOLUTION INTRAVENOUS at 04:03

## 2024-03-22 RX ADMIN — FENTANYL CITRATE 50 MCG: 50 INJECTION, SOLUTION INTRAMUSCULAR; INTRAVENOUS at 05:03

## 2024-03-22 RX ADMIN — ALLOPURINOL 100 MG: 100 TABLET ORAL at 08:03

## 2024-03-22 RX ADMIN — METOPROLOL SUCCINATE 50 MG: 50 TABLET, EXTENDED RELEASE ORAL at 08:03

## 2024-03-22 RX ADMIN — ACETAMINOPHEN 1000 MG: 1000 INJECTION, SOLUTION INTRAVENOUS at 10:03

## 2024-03-22 RX ADMIN — VECURONIUM BROMIDE 2 MG: 10 INJECTION, POWDER, FOR SOLUTION INTRAVENOUS at 05:03

## 2024-03-22 RX ADMIN — VECURONIUM BROMIDE 4 MG: 10 INJECTION, POWDER, FOR SOLUTION INTRAVENOUS at 04:03

## 2024-03-22 RX ADMIN — FENTANYL CITRATE 50 MCG: 50 INJECTION, SOLUTION INTRAMUSCULAR; INTRAVENOUS at 04:03

## 2024-03-22 RX ADMIN — FUROSEMIDE 20 MG: 20 TABLET ORAL at 08:03

## 2024-03-22 RX ADMIN — ACETAMINOPHEN 1000 MG: 1000 INJECTION, SOLUTION INTRAVENOUS at 06:03

## 2024-03-22 RX ADMIN — MIDAZOLAM HYDROCHLORIDE 1 MG: 1 INJECTION, SOLUTION INTRAMUSCULAR; INTRAVENOUS at 04:03

## 2024-03-22 RX ADMIN — TRANEXAMIC ACID 1000 MG: 100 INJECTION, SOLUTION INTRAVENOUS at 05:03

## 2024-03-22 RX ADMIN — KETOROLAC TROMETHAMINE 30 MG: 30 INJECTION, SOLUTION INTRAMUSCULAR; INTRAVENOUS at 06:03

## 2024-03-22 RX ADMIN — SACUBITRIL AND VALSARTAN 1 TABLET: 97; 103 TABLET, FILM COATED ORAL at 08:03

## 2024-03-22 RX ADMIN — HYDROMORPHONE HYDROCHLORIDE 0.25 MG: 1 INJECTION, SOLUTION INTRAMUSCULAR; INTRAVENOUS; SUBCUTANEOUS at 06:03

## 2024-03-22 RX ADMIN — SODIUM CHLORIDE 1000 ML: 9 INJECTION, SOLUTION INTRAVENOUS at 02:03

## 2024-03-22 RX ADMIN — CEFOXITIN SODIUM 2 G: 2 POWDER, FOR SOLUTION INTRAVENOUS at 04:03

## 2024-03-22 RX ADMIN — SODIUM CHLORIDE: 4.5 INJECTION, SOLUTION INTRAVENOUS at 07:03

## 2024-03-22 RX ADMIN — SODIUM CHLORIDE: 9 INJECTION, SOLUTION INTRAVENOUS at 03:03

## 2024-03-22 RX ADMIN — ATORVASTATIN CALCIUM 40 MG: 40 TABLET, FILM COATED ORAL at 08:03

## 2024-03-22 NOTE — PLAN OF CARE
Problem: Adult Inpatient Plan of Care  Goal: Plan of Care Review  Outcome: Ongoing, Progressing  Goal: Patient-Specific Goal (Individualized)  Outcome: Ongoing, Progressing  Goal: Absence of Hospital-Acquired Illness or Injury  Outcome: Ongoing, Progressing  Goal: Optimal Comfort and Wellbeing  Outcome: Ongoing, Progressing  Goal: Readiness for Transition of Care  Outcome: Ongoing, Progressing     Problem: Fluid and Electrolyte Imbalance (Acute Kidney Injury/Impairment)  Goal: Fluid and Electrolyte Balance  Outcome: Ongoing, Progressing     Problem: Renal Function Impairment (Acute Kidney Injury/Impairment)  Goal: Effective Renal Function  Outcome: Ongoing, Progressing     Problem: Fall Injury Risk  Goal: Absence of Fall and Fall-Related Injury  Outcome: Ongoing, Progressing     Problem: Fluid Deficit (Intestinal Obstruction)  Goal: Fluid Balance  Outcome: Ongoing, Progressing     Problem: Infection (Intestinal Obstruction)  Goal: Absence of Infection Signs and Symptoms  Outcome: Ongoing, Progressing     Problem: Nausea and Vomiting (Intestinal Obstruction)  Goal: Nausea and Vomiting Relief  Outcome: Ongoing, Progressing     Problem: Pain (Intestinal Obstruction)  Goal: Acceptable Pain Control  Outcome: Ongoing, Progressing

## 2024-03-22 NOTE — H&P (VIEW-ONLY)
Patient is an 88 yr old male who presents to the ER Department per [facility] with a history of CAD s/p PCI X 4, ischemic cardiomyopathy, HTN, and hyperlipidemia. Today he complains of acute onset of right Groin pain, 1/10 in severity, He reports nausea without vomiting  and denies any abdominal pain, dysuria, hematuria, urinary frequency, or flank pain. He denies any constipation or diarrhea and his most recent bowel movement was last night.  His right groin pain increased to 10/10 in severity which is what prompted him to come to [facility] for further evaluation.  He has a history of left inguinal hernia repair and he noticed a bulge in the right groin this morning.  On arrival to the ER he was hypertensive with a blood pressure of 241/95 and his initial labwork was remarkable for a Bun of 46, creatinine of 1.33, glucose of 237.  CT of the abdomen and pelvis showed an early or partial close loop small bowel obstruction involving a right inguinal hernia, suspected developing fecal impaction in the rectosigmoid , and a 12 cm fat filled umbilical hernia extending through a 4 cm opening involving the ventral wall of the right supraumbilical region.  He has received normal saline 500 ml IV and his right inguinal hernia was reduced in the ER.  He is otherwise in stable condition and he has no other complaints today.        No known allergies     Past Medial History   Anxiety  Bilateral inguinal hernia  CHF(congestive heart failure)  Coronary Artery disease   Gout   Hyperlipidemia  HTN(Hypertension)  Hyperthyroidism/Thyroid Nodule   Ischemic Cardiomyopathy      Past Surgical History   Cholecystectomy - 1990  Coronary Angioplasty With Stent Placement - 1997   Inguinal Hernia Repair - Left - > 40 yrs   Knee arthroscopy (Calcium Deposit Removal ) - Right - 08/10/2018- Dr Noguera  Right Great Toe Amputation - 1998- Dr. Jean Baptiste  6.   No Colonoscopy  7.   No EGD  8.   Angiogram -02/2024- Dr. Hirsch  9.   No  Stress test   10. No Echocardiogram      Immunizations  Covid 19 Vaccine - X1  No Hepatitis vaccine  No Shingles vaccine  No Pneumonia vaccine  No Flu vaccine  No DTAP/Tetanus vaccine    Family History  Mother  of Heart Condition  Father  in his 90's, broke hip and basically gave up on life.       Social History  Former Smoker - Quit smoking 50+ yrs ago.  Only smoked short while in his early 20's  No ETOH  No Drugs   Service - 4 yrs in [employer], Honorable Discharge, [employer], [employer].  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  9.   Education Level - College Graduate - Associate Degree  10. Resides in [place], [place]  11. PCP is Dr Hirsch, Cardiologist - But looking for a Primary Physician    Review of Systems  Patient is positive for Nausea  Patient is positive for Rt Groin Pain    Objective  Vital Signs - 2024  Temp is 97.8F (36.6 C)  Pulse is 63  Resp is 17  BP is 168/72  SPO2 is 99%  6.   Last Weight is 86.1 kg - Adj. Weight is 81 kg(178 lb 9.2 oz)  7.   Height is 6' (182.9 cm)  8.   BMI is 25.74 kg/m2      Physical Exam  Elderly  male in no acute distress  Oropharynx is clear.   Head is atraumatic  Pupils are round and equal and reactive to light. Noted patient does wear glasses to read.   Neck is supple with normal range of motion.  Regular rate and Rhythm with no murmurs, rubs or gallops  Lungs are clear to auscultation bilaterally. No rales, wheezes, or rhonchi  Soft, non tender, non distended, normoactive bowel sounds present.  Umbilical hernia present.  No clubbing, cyanosis, or edema  Patient is alert and oriented to person, place and time   No rash on skin, tear oe ulcer   Reduced Right Inguinal Hernia    Lab Findings  CBC 2024  5.04>    11.9/36.3    <147   91.7/30.1  CMP  140/3.8       112/22      37.0/1.16         <135    8.7/        CT Abdomen  Pelvis With IV Contrast  Impression  1. Early or partial close loop small bowel obstruction involving a right inguinal hernia as described above.   2.  Suspect developing fecal impaction in the rectosigmoid.   3.  12 cm fat filled umbilical hernia extending through a 4 cm opening involving the ventral wall of the right supraumbilical region.   4.  Suspect gynecomastia, left greater than right.      Assessment  Patient is an 88 yr old male who presents to the ER Department per [facility] with a history of CAD s/p PCI X 4, ischemic cardiomyopathy, HTN, and hyperlipidemia. Today he complains of acute onset of right Groin pain, 1/10 in severity, He reports nausea without vomiting  and denies any abdominal pain, dysuria, hematuria, urinary frequency, or flank pain. He denies any constipation or diarrhea and his most recent bowel movement was last night.  His right groin pain increased to 10/10 in severity which is what prompted him to come to [facility] for further evaluation.  He has a history of left inguinal hernia repair and he noticed a bulge in the right groin this morning.  On arrival to the ER he was hypertensive with a blood pressure of 241/95 and his initial labwork was remarkable for a Bun of 46, creatinine of 1.33, glucose of 237.  CT of the abdomen and pelvis showed an early or partial close loop small bowel obstruction involving a right inguinal hernia, suspected developing fecal impaction in the rectosigmoid , and a 12 cm fat filled umbilical hernia extending through a 4 cm opening involving the ventral wall of the right supraumbilical region.  He has received normal saline 500 ml IV and his right inguinal hernia was reduced in the ER.  He is otherwise in stable condition and he has no other complaints today.        Plan   IV Fluids  2.. Laparoscopic Repair of Inguinal Hernia

## 2024-03-22 NOTE — OP NOTE
[facility]/Surg  Operative Note      Date of Procedure: 3/22/2024     Procedure: Procedure(s) (LRB):  REPAIR, HERNIA, INGUINAL, LAPAROSCOPIC (Right)     Surgeon(s) and Role:     * Reggie Miller MD - Primary    Assisting Surgeon: None    Pre-Operative Diagnosis: Right inguinal hernia [K40.90]    Post-Operative Diagnosis: Post-Op Diagnosis Codes:     * Right inguinal hernia [K40.90]    Anesthesia: General    Operative Findings (including complications, if any):  Patient is a 88-year-old  male with a history of incarcerated recurrent right inguinal hernia with the associated obstruction that was reduced in the emergency room.  Patient was admitted for right inguinal hernia repair.  Patient had preoperative cardiac clearance obtained.  Patient was brought to the OR supine position general anesthetic prepped and draped in a sterile fashion.  Patient had a left upper quadrant incision made with insertion of Veress needle insufflation abdomen of 14 mm of mercury with insertion of a 5 mm trocar.  The trocar was placed under direct vision.  Patient then underwent insertion of a 10 mm trocar in the preperitoneal space along the right paramedian incision near the umbilicus.  Patient had a balloon dissector placed and then dissection of the right indirect inguinal hernia defect that was causing him symptomatology.  Patient had the ilioinguinal ligament conjoined tendon and internal oblique aponeuroses exposed.  Patient had a 3DMax large mesh tacked to the ilioinguinal ligament conjoined tendon and internal oblique aponeuroses.  The hernia sac was tacked to the lateral aspect of the defect.  The hernia sac was  from the cord and vas deferens.  The peritoneum was reapproximated over the mesh no problems were encountered.  The aponeuroses of the 5 mm trocar sites were closed with 0 Vicryl on  needle subQ was closed with 4-0 plain gut suture Dermabond was used for skin.  Sterile dressings  were applied no problems encountered patient tolerated procedure well.       I did look in the left side there was a left inguinal hernia that was a direct defect but it was a wide mouth and not consequential.      The patient also had a hernia defect in the supra umbilical region just to the right of midline consistent with his previous cholecystectomy this at this time was not consequential and wide mouth as well.        Description of Technical Procedures:  Noted above       Significant Surgical Tasks Conducted by the Assistant(s), if Applicable:  None       Estimated Blood Loss (EBL): * No values recorded between 3/22/2024  5:10 PM and 3/22/2024  6:18 PM *           Implants:   Implant Name Type Inv. Item Serial No.  Lot No. LRB No. Used Action   MESH POLY 3DMAX LG RIGHT. - [id number]  MESH POLY 3DMAX LG RIGHT.  [employer] [id number] Right 1 Implanted       Specimens:   Specimen (24h ago, onward)      None                    Condition: Good    Disposition: PACU - hemodynamically stable.    Attestation: I was present and scrubbed for the entire procedure.    Discharge Note    OUTCOME: Patient tolerated treatment/procedure well without complication and is now ready for discharge.      DISPOSITION: Home or Self Care    FINAL DIAGNOSIS:  Right inguinal hernia recurrent not obstructing reducible incisional    FOLLOWUP: In clinic 1 week    DISCHARGE INSTRUCTIONS:  No discharge procedures on file.

## 2024-03-22 NOTE — ANESTHESIA POSTPROCEDURE EVALUATION
Anesthesia Post Evaluation    Patient: Luis Felipe Puri    Procedure(s) Performed: Procedure(s) (LRB):  REPAIR, HERNIA, INGUINAL, LAPAROSCOPIC (Right)    Final Anesthesia Type: general      Patient location during evaluation: PACU  Patient participation: Yes- Able to Participate  Level of consciousness: awake and alert, awake and oriented  Post-procedure vital signs: reviewed and stable  Pain management: adequate  Airway patency: patent    PONV status at discharge: No PONV  Anesthetic complications: no      Cardiovascular status: blood pressure returned to baseline  Respiratory status: unassisted, room air and spontaneous ventilation  Hydration status: euvolemic  Follow-up not needed.              Vitals Value Taken Time   /89 03/22/24 1820   Temp 36.2 °C (97.2 °F) 03/22/24 1815   Pulse 73 03/22/24 1823   Resp 18 03/22/24 1134   SpO2 97 % 03/22/24 1823   Vitals shown include unvalidated device data.      No case tracking events are documented in the log.      Pain/Zita Score: No data recorded

## 2024-03-22 NOTE — INTERVAL H&P NOTE
The patient has been examined and the H&P has been reviewed:    I concur with the findings and no changes have occurred since H&P was written.    Surgery risks, benefits and alternative options discussed and understood by patient/family.          Active Hospital Problems    Diagnosis  POA    *Right inguinal hernia [K40.90]  Unknown    Hypertensive urgency [I16.0]  Unknown    CHF (congestive heart failure) [I50.9]  Unknown    Hyperthyroidism [E05.90]  Unknown    Ischemic cardiomyopathy [I25.5]  Unknown    Hyperlipidemia [E78.5]  Unknown    Gout [M10.9]  Unknown    Coronary artery disease [I25.10]  Unknown    Acute kidney injury [N17.9]  Unknown    Hyperglycemia [R73.9]  Unknown      Resolved Hospital Problems   No resolved problems to display.

## 2024-03-22 NOTE — PLAN OF CARE
Problem: Adult Inpatient Plan of Care  Goal: Plan of Care Review  Outcome: Ongoing, Progressing  Goal: Patient-Specific Goal (Individualized)  Outcome: Ongoing, Progressing  Goal: Absence of Hospital-Acquired Illness or Injury  Outcome: Ongoing, Progressing  Goal: Optimal Comfort and Wellbeing  Outcome: Ongoing, Progressing  Goal: Readiness for Transition of Care  Outcome: Ongoing, Progressing     Problem: Fluid and Electrolyte Imbalance (Acute Kidney Injury/Impairment)  Goal: Fluid and Electrolyte Balance  Outcome: Ongoing, Progressing     Problem: Oral Intake Inadequate (Acute Kidney Injury/Impairment)  Goal: Optimal Nutrition Intake  Outcome: Ongoing, Progressing     Problem: Renal Function Impairment (Acute Kidney Injury/Impairment)  Goal: Effective Renal Function  Outcome: Ongoing, Progressing     Problem: Fall Injury Risk  Goal: Absence of Fall and Fall-Related Injury  Outcome: Ongoing, Progressing     Problem: Fluid Deficit (Intestinal Obstruction)  Goal: Fluid Balance  Outcome: Ongoing, Progressing     Problem: Infection (Intestinal Obstruction)  Goal: Absence of Infection Signs and Symptoms  Outcome: Ongoing, Progressing     Problem: Nausea and Vomiting (Intestinal Obstruction)  Goal: Nausea and Vomiting Relief  Outcome: Ongoing, Progressing     Problem: Pain (Intestinal Obstruction)  Goal: Acceptable Pain Control  Outcome: Ongoing, Progressing

## 2024-03-22 NOTE — SUBJECTIVE & OBJECTIVE
Past Medical History:   Diagnosis Date    Anxiety     Bilateral inguinal hernia     CHF (congestive heart failure)     Coronary artery disease     Gout     Hyperlipidemia     Hypertension     Hyperthyroidism     Ischemic cardiomyopathy     Thyroid nodule        Past Surgical History:   Procedure Laterality Date    CHOLECYSTECTOMY      1990    CORONARY ANGIOPLASTY WITH STENT PLACEMENT      INGUINAL HERNIA REPAIR Left     KNEE ARTHROSCOPY Right     8/10/2018    RIGHT GREAT TOE AMPUTATION         Review of patient's allergies indicates:  No Known Allergies    No current facility-administered medications on file prior to encounter.     Current Outpatient Medications on File Prior to Encounter   Medication Sig    allopurinoL (ZYLOPRIM) 100 MG tablet Take 100 mg by mouth once daily.    atorvastatin (LIPITOR) 40 MG tablet Take 40 mg by mouth once daily.    ENTRESTO  mg per tablet Take 1 tablet by mouth 2 (two) times daily.    furosemide (LASIX) 20 MG tablet Take 20 mg by mouth 2 (two) times daily.    methIMAzole (TAPAZOLE) 5 MG Tab Take 5 mg by mouth once daily.    metoprolol succinate (TOPROL-XL) 50 MG 24 hr tablet Take 50 mg by mouth once daily.     Family History       Problem Relation (Age of Onset)    Cancer Mother    Ovarian cancer Sister    Pancreatic cancer Brother          Tobacco Use    Smoking status: Never    Smokeless tobacco: Never   Substance and Sexual Activity    Alcohol use: Never    Drug use: Never    Sexual activity: Not on file     Review of Systems   Constitutional: Positive for malaise/fatigue.   HENT: Negative.     Eyes: Negative.    Cardiovascular: Negative.    Respiratory: Negative.     Endocrine: Negative.    Hematologic/Lymphatic: Negative.    Skin:  Positive for dry skin.   Musculoskeletal:  Positive for arthritis.   Gastrointestinal:  Positive for abdominal pain and nausea.   Genitourinary:  Positive for pelvic pain.   Neurological: Negative.    Psychiatric/Behavioral: Negative.      Allergic/Immunologic: Negative.      Objective:     Vital Signs (Most Recent):  Temp: 97.9 °F (36.6 °C) (03/22/24 1134)  Pulse: 74 (03/22/24 1134)  Resp: 18 (03/22/24 1134)  BP: (!) 142/57 (03/22/24 1134)  SpO2: 98 % (03/22/24 1134) Vital Signs (24h Range):  Temp:  [97.4 °F (36.3 °C)-98.9 °F (37.2 °C)] 97.9 °F (36.6 °C)  Pulse:  [57-90] 74  Resp:  [17-20] 18  SpO2:  [95 %-100 %] 98 %  BP: (142-200)/(57-95) 142/57     Weight: 86.1 kg (189 lb 13.1 oz)  Body mass index is 25.74 kg/m².    SpO2: 98 %         Intake/Output Summary (Last 24 hours) at 3/22/2024 1406  Last data filed at 3/22/2024 0555  Gross per 24 hour   Intake 1726.33 ml   Output 1300 ml   Net 426.33 ml       Lines/Drains/Airways       Peripheral Intravenous Line  Duration                  Peripheral IV - Single Lumen 03/1935 22 G Posterior;Right Forearm <1 day                     Physical Exam  Constitutional:       General: He is not in acute distress.  HENT:      Head: Normocephalic and atraumatic.      Nose: No congestion.      Mouth/Throat:      Mouth: Mucous membranes are dry.   Eyes:      Extraocular Movements: Extraocular movements intact.      Pupils: Pupils are equal, round, and reactive to light.   Cardiovascular:      Rate and Rhythm: Normal rate.   Pulmonary:      Effort: Pulmonary effort is normal.      Breath sounds: Normal breath sounds.   Abdominal:      Palpations: Abdomen is soft.      Tenderness: There is abdominal tenderness.   Genitourinary:     Comments: + R IH  Musculoskeletal:      Cervical back: Neck supple.      Right lower leg: No edema.   Skin:     General: Skin is warm and dry.   Neurological:      General: No focal deficit present.      Mental Status: He is alert.   Psychiatric:         Mood and Affect: Mood normal.          Significant Labs: CMP   Recent Labs   Lab 03/21/24  1132 03/22/24  0445    140   K 4.6 3.8   CO2 23 22   BUN 46.0* 37.0*   CREATININE 1.33* 1.16   CALCIUM 9.3 8.7   ALBUMIN 3.8 3.3*    BILITOT 0.8 0.8   ALKPHOS 161* 137   AST 31 30   ALT 41 34    and CBC   Recent Labs   Lab 03/21/24  1132 03/22/24  0445   WBC 5.76 5.04   HGB 13.1 11.9*   HCT 40.0 36.3    147

## 2024-03-22 NOTE — PLAN OF CARE
03/22/24 0742   FORD Message   Medicare Outpatient and Observation Notification regarding financial responsibility Given to patient/caregiver;Explained to patient/caregiver;Signed/date by patient/caregiver   Date FORD was signed 03/21/24

## 2024-03-22 NOTE — PLAN OF CARE
03/22/24 0933   Discharge Assessment   Assessment Type Discharge Planning Assessment   Confirmed/corrected address, phone number and insurance Yes   Confirmed Demographics Correct on Facesheet   Source of Information patient;family   When was your last doctors appointment?   (has no PCP, uses urgent care)   Does patient/caregiver understand observation status Yes   Communicated JENSEN with patient/caregiver Date not available/Unable to determine   Reason For Admission hernia, bowel obstruction   People in Home spouse   Facility Arrived From: home   Do you expect to return to your current living situation? Yes   Do you have help at home or someone to help you manage your care at home? Yes   Who are your caregiver(s) and their phone number(s)? wife Brissa Puri [phone]   Prior to hospitilization cognitive status: Alert/Oriented   Current cognitive status: Alert/Oriented   Walking or Climbing Stairs Difficulty yes   Walking or Climbing Stairs ambulation difficulty, requires equipment;transferring difficulty, requires equipment   Mobility Management walker   Dressing/Bathing Difficulty yes   Dressing/Bathing bathing difficulty, requires equipment   Dressing/Bathing Management shower chair   Home Accessibility stairs to enter home   Number of Stairs, Main Entrance three   Surface of Stairs, Main Entrance concrete   Stair Railings, Main Entrance railings safe and in good condition   Landing, Stairs, Main Entrance adequate turning radius   Home Layout Able to live on 1st floor   Equipment Currently Used at Home walker, rolling;rollator;shower chair   Readmission within 30 days? No   Patient currently being followed by outpatient case management? No   Do you currently have service(s) that help you manage your care at home? No   Do you take prescription medications? Yes   Do you have prescription coverage? Yes   Coverage MCR   Do you have any problems affording any of your prescribed medications? No   Is the patient  taking medications as prescribed? yes   Who is going to help you get home at discharge? wife   How do you get to doctors appointments? family or friend will provide   Are you on dialysis? No   Do you take coumadin? No   Discharge Plan A Home;Home Health   Discharge Plan B Rehab   DME Needed Upon Discharge  none   Discharge Plan discussed with: Spouse/sig other;Patient   Transition of Care Barriers None   Physical Activity   On average, how many days per week do you engage in moderate to strenuous exercise (like a brisk walk)? 0 days   On average, how many minutes do you engage in exercise at this level? 0 min   Financial Resource Strain   How hard is it for you to pay for the very basics like food, housing, medical care, and heating? Not hard   Housing Stability   In the last 12 months, was there a time when you were not able to pay the mortgage or rent on time? N   In the last 12 months, how many places have you lived? 1   In the last 12 months, was there a time when you did not have a steady place to sleep or slept in a shelter (including now)? N   Transportation Needs   In the past 12 months, has lack of transportation kept you from medical appointments or from getting medications? no   In the past 12 months, has lack of transportation kept you from meetings, work, or from getting things needed for daily living? No   Food Insecurity   Within the past 12 months, you worried that your food would run out before you got the money to buy more. Never true   Within the past 12 months, the food you bought just didn't last and you didn't have money to get more. Never true   Stress   Do you feel stress - tense, restless, nervous, or anxious, or unable to sleep at night because your mind is troubled all the time - these days? Not at all   Social Connections   In a typical week, how many times do you talk on the phone with family, friends, or neighbors? More than 3   How often do you get together with friends or relatives?  More than 3   How often do you attend Gnosticism or Adventist services? More than 4   Do you belong to any clubs or organizations such as Gnosticism groups, unions, fraternal or athletic groups, or school groups? Yes   How often do you attend meetings of the clubs or organizations you belong to? More than 4   Are you , , , , never , or living with a partner?    Alcohol Use   Q1: How often do you have a drink containing alcohol? Never   Q2: How many drinks containing alcohol do you have on a typical day when you are drinking? None   Q3: How often do you have six or more drinks on one occasion? Never

## 2024-03-22 NOTE — CONSULTS
[facility]-Med/Surg  Cardiology  Consult Note    Patient Name: Luis Felipe Puri  MRN: [id number]  Admission Date: 3/21/2024  Hospital Length of Stay: 0 days  Code Status: Full Code   Attending Provider: Dr Ingram  Consulting Provider: Joel Hirsch MD  Primary Care Physician: Shraddha Cardenas NP  Principal Problem:Right inguinal hernia    Patient information was obtained from patient , MD notes.     Consults  Subjective:     88-year-old gentleman, personal history of severe LV dysfunction, CAD, remote PCI, hypertension, chronic renal disease, anemia, hyperthyroid-admitted with symptomatic right inguinal hernia in need of surgical intervention.  Cardiology consulted for preop risk assessment.  Patient  well known to me.  No reported chest pain, PND, orthopnea, symptomatic palpitation, syncope.      Preop risk assessment for symptomatic right inguinal hernia surgical intervention  Personal history of severe LV dysfunction  Hypertension  Chronic renal disease  CAD prior history of remote PCI   Personal history of hyperthyroid   History of gout.      Past Medical History:   Diagnosis Date    Anxiety     Bilateral inguinal hernia     CHF (congestive heart failure)     Coronary artery disease     Gout     Hyperlipidemia     Hypertension     Hyperthyroidism     Ischemic cardiomyopathy     Thyroid nodule        Past Surgical History:   Procedure Laterality Date    CHOLECYSTECTOMY      1990    CORONARY ANGIOPLASTY WITH STENT PLACEMENT      INGUINAL HERNIA REPAIR Left     KNEE ARTHROSCOPY Right     8/10/2018    RIGHT GREAT TOE AMPUTATION         Review of patient's allergies indicates:  No Known Allergies    No current facility-administered medications on file prior to encounter.     Current Outpatient Medications on File Prior to Encounter   Medication Sig    allopurinoL (ZYLOPRIM) 100 MG tablet Take 100 mg by mouth once daily.    atorvastatin (LIPITOR) 40 MG tablet Take 40 mg by mouth once daily.     ENTRESTO  mg per tablet Take 1 tablet by mouth 2 (two) times daily.    furosemide (LASIX) 20 MG tablet Take 20 mg by mouth 2 (two) times daily.    methIMAzole (TAPAZOLE) 5 MG Tab Take 5 mg by mouth once daily.    metoprolol succinate (TOPROL-XL) 50 MG 24 hr tablet Take 50 mg by mouth once daily.     Family History       Problem Relation (Age of Onset)    Cancer Mother    Ovarian cancer Sister    Pancreatic cancer Brother          Tobacco Use    Smoking status: Never    Smokeless tobacco: Never   Substance and Sexual Activity    Alcohol use: Never    Drug use: Never    Sexual activity: Not on file     Review of Systems   Constitutional: Positive for malaise/fatigue.   HENT: Negative.     Eyes: Negative.    Cardiovascular: Negative.    Respiratory: Negative.     Endocrine: Negative.    Hematologic/Lymphatic: Negative.    Skin:  Positive for dry skin.   Musculoskeletal:  Positive for arthritis.   Gastrointestinal:  Positive for abdominal pain and nausea.   Genitourinary:  Positive for pelvic pain.   Neurological: Negative.    Psychiatric/Behavioral: Negative.     Allergic/Immunologic: Negative.      Objective:     Vital Signs (Most Recent):  Temp: 97.9 °F (36.6 °C) (03/22/24 1134)  Pulse: 74 (03/22/24 1134)  Resp: 18 (03/22/24 1134)  BP: (!) 142/57 (03/22/24 1134)  SpO2: 98 % (03/22/24 1134) Vital Signs (24h Range):  Temp:  [97.4 °F (36.3 °C)-98.9 °F (37.2 °C)] 97.9 °F (36.6 °C)  Pulse:  [57-90] 74  Resp:  [17-20] 18  SpO2:  [95 %-100 %] 98 %  BP: (142-200)/(57-95) 142/57     Weight: 86.1 kg (189 lb 13.1 oz)  Body mass index is 25.74 kg/m².    SpO2: 98 %         Intake/Output Summary (Last 24 hours) at 3/22/2024 1406  Last data filed at 3/22/2024 0555  Gross per 24 hour   Intake 1726.33 ml   Output 1300 ml   Net 426.33 ml       Lines/Drains/Airways       Peripheral Intravenous Line  Duration                  Peripheral IV - Single Lumen 03/1935 22 G Posterior;Right Forearm <1 day                      Physical Exam  Constitutional:       General: He is not in acute distress.  HENT:      Head: Normocephalic and atraumatic.      Nose: No congestion.      Mouth/Throat:      Mouth: Mucous membranes are dry.   Eyes:      Extraocular Movements: Extraocular movements intact.      Pupils: Pupils are equal, round, and reactive to light.   Cardiovascular:      Rate and Rhythm: Normal rate. , soft systolic murmur left sternal border Regular rate and rhythm, S4 gallop, neck veins  Pulmonary:      Effort: Pulmonary effort is normal.      Breath sounds: Normal breath sounds.   Abdominal:      Palpations: Abdomen is soft.      Tenderness: There is abdominal tenderness.   Genitourinary:     Comments: + R IH  Musculoskeletal:      Cervical back: Neck supple.      Right lower leg: No edema.   Skin:     General: Skin is warm and dry.   Neurological:      General: No focal deficit present.      Mental Status: He is alert.   Psychiatric:         Mood and Affect: Mood normal.          Significant Labs: CMP   Recent Labs   Lab 03/21/24  1132 03/22/24  0445    140   K 4.6 3.8   CO2 23 22   BUN 46.0* 37.0*   CREATININE 1.33* 1.16   CALCIUM 9.3 8.7   ALBUMIN 3.8 3.3*   BILITOT 0.8 0.8   ALKPHOS 161* 137   AST 31 30   ALT 41 34    and CBC   Recent Labs   Lab 03/21/24  1132 03/22/24  0445   WBC 5.76 5.04   HGB 13.1 11.9*   HCT 40.0 36.3    147     EKG - pending  PCXR - pending  Echo - EF 40%      Assessment and Plan:       Preop risk assessment for symptomatic right inguinal hernia surgical intervention  Personal history of severe LV dysfunction  Hypertension  Chronic renal disease /prerenal component  CAD prior history of remote PCI   Personal history of hyperthyroid   History of gout.    Moderate risk for above surgical intervention  Echo reviewed per MD EF 40%-markedly improved from past EF  Preop/post beta blocker/Entresto/diuretics  Gentle IV fluid hydration   Follow-up postop    Appreciate Dr Miller, Dr Ingram's  help / consult        Joel Hirsch MD  Cardiology   [facility]

## 2024-03-22 NOTE — CONSULTS
Patient is an 88 yr old male who presents to the ER Department per [facility] with a history of CAD s/p PCI X 4, ischemic cardiomyopathy, HTN, and hyperlipidemia. Today he complains of acute onset of right Groin pain, 1/10 in severity, He reports nausea without vomiting  and denies any abdominal pain, dysuria, hematuria, urinary frequency, or flank pain. He denies any constipation or diarrhea and his most recent bowel movement was last night.  His right groin pain increased to 10/10 in severity which is what prompted him to come to [facility] for further evaluation.  He has a history of left inguinal hernia repair and he noticed a bulge in the right groin this morning.  On arrival to the ER he was hypertensive with a blood pressure of 241/95 and his initial labwork was remarkable for a Bun of 46, creatinine of 1.33, glucose of 237.  CT of the abdomen and pelvis showed an early or partial close loop small bowel obstruction involving a right inguinal hernia, suspected developing fecal impaction in the rectosigmoid , and a 12 cm fat filled umbilical hernia extending through a 4 cm opening involving the ventral wall of the right supraumbilical region.  He has received normal saline 500 ml IV and his right inguinal hernia was reduced in the ER.  He is otherwise in stable condition and he has no other complaints today.       Patient in addition to this had a reducible right inguinal hernia that was his primary complaint.  It was not obstructed patient underwent reduction in the ER.  Preop cardiac evaluation has been ordered.  Echo has been ordered as well.  My suspicion is the patient might have a left inguinal hernia as well but it has not symptomatic.  He does have a supraumbilical right paramedian hernia from a subcostal incision from previous open cholecystectomy.  I will fix that hernia robotically at a later date.        No known allergies     Past Medial History   Anxiety  Bilateral inguinal  hernia  CHF(congestive heart failure)  Coronary Artery disease   Gout   Hyperlipidemia  HTN(Hypertension)  Hyperthyroidism/Thyroid Nodule   Ischemic Cardiomyopathy      Past Surgical History   Cholecystectomy -   Coronary Angioplasty With Stent Placement -    Inguinal Hernia Repair - Left - > 40 yrs   Knee arthroscopy (Calcium Deposit Removal ) - Right - 08/10/2018- Dr Noguera  Right Great Toe Amputation - - Dr. Jean Baptiste  6.   No Colonoscopy  7.   No EGD  8.   Angiogram -2024- Dr. Hirsch  9.   No Stress test   10. No Echocardiogram      Immunizations  Covid 19 Vaccine - X1  No Hepatitis vaccine  No Shingles vaccine  No Pneumonia vaccine  No Flu vaccine  No DTAP/Tetanus vaccine    Family History  Mother  of Heart Condition  Father  in his 90's, broke hip and basically gave up on life.       Social History  Former Smoker - Quit smoking 50+ yrs ago.  Only smoked short while in his early 20's  No ETOH  No Drugs   Service - 4 yrs in [employer], Honorable Discharge, [employer], [place].  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  9.   Education Level - College Graduate - Associate Degree  10. Resides in [place], [place]  11. PCP is Dr Hirsch, Cardiologist - But looking for a Primary Physician    Review of Systems  Patient is positive for Nausea  Patient is positive for Rt Groin Pain    Objective  Vital Signs - 2024  Temp is 97.8F (36.6 C)  Pulse is 63  Resp is 17  BP is 168/72  SPO2 is 99%  6.   Last Weight is 86.1 kg - Adj. Weight is 81 kg(178 lb 9.2 oz)  7.   Height is 6' (182.9 cm)  8.   BMI is 25.74 kg/m2      Physical Exam  Elderly  male in no acute distress  Oropharynx is clear.   Head is atraumatic  Pupils are round and equal and reactive to light. Noted patient does wear glasses to read.   Neck is supple with normal range of motion.  Regular rate and Rhythm  with no murmurs, rubs or gallops  Lungs are clear to auscultation bilaterally. No rales, wheezes, or rhonchi  Soft, non tender, non distended, normoactive bowel sounds present.  Umbilical hernia present.  No clubbing, cyanosis, or edema  Patient is alert and oriented to person, place and time   No rash on skin, tear oe ulcer   Reduced Right Inguinal Hernia    Lab Findings  CBC 03/22/2024  5.04>    11.9/36.3    <147   91.7/30.1  CMP  140/3.8       112/22      37.0/1.16         <135    8.7/        CT Abdomen Pelvis With IV Contrast  Impression  1. Early or partial close loop small bowel obstruction involving a right inguinal hernia as described above.   2.  Suspect developing fecal impaction in the rectosigmoid.   3.  12 cm fat filled umbilical hernia extending through a 4 cm opening involving the ventral wall of the right supraumbilical region.   4.  Suspect gynecomastia, left greater than right.      Assessment  Patient is an 88 yr old male who presents to the ER Department per [facility] with a history of CAD s/p PCI X 4, ischemic cardiomyopathy, HTN, and hyperlipidemia. Today he complains of acute onset of right Groin pain, 1/10 in severity, He reports nausea without vomiting  and denies any abdominal pain, dysuria, hematuria, urinary frequency, or flank pain. He denies any constipation or diarrhea and his most recent bowel movement was last night.  His right groin pain increased to 10/10 in severity which is what prompted him to come to [facility] for further evaluation.  He has a history of left inguinal hernia repair and he noticed a bulge in the right groin this morning.  On arrival to the ER he was hypertensive with a blood pressure of 241/95 and his initial labwork was remarkable for a Bun of 46, creatinine of 1.33, glucose of 237.  CT of the abdomen and pelvis showed an early or partial close loop small bowel obstruction involving a right inguinal hernia, suspected developing fecal  impaction in the rectosigmoid , and a 12 cm fat filled umbilical hernia extending through a 4 cm opening involving the ventral wall of the right supraumbilical region.  He has received normal saline 500 ml IV and his right inguinal hernia was reduced in the ER.  He is otherwise in stable condition and he has no other complaints today.      Patient in addition to this had a reducible right inguinal hernia that was his primary complaint.  It was not obstructed patient underwent reduction in the ER.  Preop cardiac evaluation has been ordered.  Echo has been ordered as well.  My suspicion is the patient might have a left inguinal hernia as well but it has not symptomatic.  He does have a supraumbilical right paramedian hernia from a subcostal incision from previous open cholecystectomy.  I will fix that hernia robotically at a later date.        Plan   IV Fluids  2.. Laparoscopic Repair of right reducible nonobstructive Inguinal Hernia  3. . will do supraumbilical right paramedian hernia repair at a later date.  4.  will investigate left groin for possible hernia defect at the time of surgery

## 2024-03-22 NOTE — PROGRESS NOTES
Inpatient Nutrition Evaluation    Admit Date: 3/21/2024   Total duration of encounter: 1 day    Nutrition Recommendation/Prescription     Continue NPO. Once medically appropriate ADAT to Cardiac Diet with No Concentrated Sweets and no red meat.     If PO intake <50% recommend offer Boost plus 1x/day     Continue to encourage PO intake and honor patient preferences.    Dietitian will monitor Energy Intake, Food and Beverage Intake, Gastrointestinal Profile, Renal Function, Weight, and Weight Change and adjust MNT as needed.       Monitoring & Evaluation     Communication of Recommendations: reviewed with nurse and reviewed with patient    Reason Seen: continuous nutrition monitoring    Nutrition Risk/Follow-Up: Low (follow-up in 5-7 days)  Patients assigned 'Low nutrition risk' status do not qualify for a full nutritional assessment but will be monitored and re-evaluated in a 5-7 day time period. Please consult if re-evaluation needed sooner.    RD Follow-up?: Yes  Next Date to be Seen by RD: 03/27/24  Nutrition Assessment     Chart Review    Malnutrition Screening Tool Results   Have you recently lost weight without trying?: No  Have you been eating poorly because of a decreased appetite?: No   MST Score: 0     Diagnosis:  Right inguinal hernia  Hypertensive urgency  LEO  Hyperglycemia  CAD  GOUT  HLD  Ischemic cardiomyopathy  Hyperthyroidism  CHF    Past Medical History:   Diagnosis Date    Anxiety     Bilateral inguinal hernia     CHF (congestive heart failure)     Coronary artery disease     Gout     Hyperlipidemia     Hypertension     Hyperthyroidism     Ischemic cardiomyopathy     Thyroid nodule      Past Surgical History:   Procedure Laterality Date    CHOLECYSTECTOMY      1990    CORONARY ANGIOPLASTY WITH STENT PLACEMENT      INGUINAL HERNIA REPAIR Left     KNEE ARTHROSCOPY Right     8/10/2018    RIGHT GREAT TOE AMPUTATION         Scheduled Medications:  allopurinoL, 100 mg, Daily  atorvastatin, 40 mg,  Daily  enoxparin, 40 mg, Daily  furosemide, 20 mg, BID  methIMAzole, 5 mg, Daily  metoprolol succinate, 50 mg, Daily  sacubitriL-valsartan, 1 tablet, BID    Continuous Infusions:   PRN Medications: acetaminophen, aluminum-magnesium hydroxide-simethicone, bisacodyL, ceFOXItin (MEFOXIN) IVPB, dextrose 10%, dextrose 10%, famotidine, glucagon (human recombinant), glucose, glucose, HYDROcodone-acetaminophen, insulin aspart U-100, melatonin, naloxone, ondansetron, promethazine, sodium chloride 0.9%    Calorie Containing IV Medications: no significant kcals from medications at this time    Nutrition-Related Labs:  Recent Labs   Lab 24  1132 24  0445    140   K 4.6 3.8   CALCIUM 9.3 8.7   CHLORIDE 110 112*   CO2 23 22   BUN 46.0* 37.0*   CREATININE 1.33* 1.16   EGFRNORACEVR 51 61   GLUCOSE 237* 135*   BILITOT 0.8 0.8   ALKPHOS 161* 137   ALT 41 34   AST 31 30   ALBUMIN 3.8 3.3*   HGBA1C 8.3*  --    WBC 5.76 5.04   HGB 13.1 11.9*   HCT 40.0 36.3     CrCl:    Lab Value: 48.3    Date: 3/22    Nutrition Orders:  Diet NPO    Other Oral Supplement Order: None/Already listed above  Appetite/Oral Intake: NPO/NPO  Factors Affecting Nutritional Intake: altered gastrointestinal function and NPO  Food/Yazdanism/Cultural Preferences: none reported  Food Allergies: Review of patient's allergies indicates:  No Known Allergies    Skin Integrity: abrasion, bruised (ecchymotic)  Wound(s):       Overview/Hospital Course:    (3/22): Patient reports good appetite/hungry but unable to eat since yesterday morning. Patient reports some weight gain but no weight loss, no issues c/s and NKFA. Patient eats red meat but not often and as such just reports NO red meat. Patient with A1c of 8.3 as such recommended NCS modifier, RD to education once consulted. GI: +BS, OBESE, BULGE TO RIGHT GROING, and DISCOMFORT /LBM-3/20, : WDL, FUNCTIONAL SCREEN:Eatin - independent, Nutrition: 3-->adequate,Cristino Score: 19, 1+ BILATERAL LEG EDEMA  NOTED, 24 HR I/O: 1726/1300.      Anthropometrics    Height: 6' (182.9 cm) Height Method: Stated  Last Weight: 86.1 kg (189 lb 13.1 oz) (03/21/24 1100) Weight Method: Bed Scale  BMI (Calculated): 25.7  BMI Classification: overweight (BMI 25-29.9)        Ideal Body Weight (IBW), Male: 178 lb     % Ideal Body Weight, Male (lb): 106.64 %                          Usual Weight Provided By: patient denies unintentional weight loss    Wt Readings from Last 5 Encounters:   03/21/24 86.1 kg (189 lb 13.1 oz)   02/16/23 81.6 kg (180 lb)     Weight Change(s) Since Admission:  Admit Weight: 86.1 kg (189 lb 13.1 oz) (03/21/24 1100)      Patient Education    Not applicable.

## 2024-03-22 NOTE — ANESTHESIA PROCEDURE NOTES
Intubation    Date/Time: 3/22/2024 4:35 PM    Performed by: Dewayne Weeks CRNA  Authorized by: Dewayne Weeks CRNA    Intubation:     Induction:  Intravenous    Intubated:  Postinduction    Mask Ventilation:  Easy mask    Attempts:  1    Attempted By:  CRNA    Method of Intubation:  Direct    Blade:  Molina 2    Laryngeal View Grade: Grade I - full view of cords      Difficult Airway Encountered?: No      Airway Device:  Oral endotracheal tube    Airway Device Size:  7.5    Style/Cuff Inflation:  Cuffed    Tube secured:  20    Secured at:  The lips    Placement Verified By:  Capnometry    Complicating Factors:  None    Findings Post-Intubation:  BS equal bilateral and atraumatic/condition of teeth unchanged

## 2024-03-22 NOTE — ANESTHESIA PREPROCEDURE EVALUATION
03/22/2024  Luis Felipe Puri is a 88 y.o., male.  Anesthesia History    No specialty history recorded    Other Medical History   Anxiety Hypertension   Coronary artery disease Hyperlipidemia   Ischemic cardiomyopathy Bilateral inguinal hernia   Hyperthyroidism Thyroid nodule   Gout CHF (congestive heart failure)     Surgical History    INGUINAL HERNIA REPAIR CHOLECYSTECTOMY   KNEE ARTHROSCOPY CORONARY ANGIOPLASTY WITH STENT PLACEMENT   RIGHT GREAT TOE AMPUTATION      COVID-19 Risk of Complications  Current as of 14 minutes ago    7 0 - 3 Points: Low Risk   3 - 6 Points: Medium Risk   6 - 16 Points: High Risk     No Change      Details    This score indicates the number of risk factors that a patient over the age of 18  has for severe illness or mortality from a COVID-19 infection. A lower score means fewer risk factors and is preferred.            Age: 88      Current as of 14 minutes ago   Has Congestive Heart Failure: Yes      Current as of 14 minutes ago   Has Congenital Heart Disease : No      Current as of 14 minutes ago   Has Coronary Artery Disease: Yes      Current as of 14 minutes ago   Has End-Stage Renal Disease : No      Current as of 14 minutes ago   Has End-Stage Liver Disease: No      Current as of 14 minutes ago   Has Chronic Pulmonary Disease: No      Current as of 14 minutes ago   Has Diabetes: No      Current as of 14 minutes ago   Is Immunocompromised : No      Current as of 14 minutes ago   Resides in Nursing Home: No      Current as of 14 minutes ago   Pregnant: No      Current as of 14 minutes ago   Legal Sex: Male      Current as of 14 minutes ago   Has Hypertension: Yes      Current as of 14 minutes ago   Has Obesity: No      Current as of 14 minutes ago       Substance History    Smoking Status: Never   Smokeless Tobacco Status: Never   Alcohol use: Never   Drug use: Never      Anesthesia Family History    No history of this type found      Current Gender Identity          Patient Name: Luis Felipe Puri  MRN: [id number]  Patient Class: OP- Observation  Admission Date: 3/21/2024  Attending Physician: Ralf Ingram MD   Primary Care Provider: Shraddha Cardenas NP           Patient information was obtained from patient and ER records.      Subjective:      Principal Problem:Right inguinal hernia     Chief Complaint: Right groin pain.     HPI: Mr. Puri is a 88 year old  male with a history of CAD s/p PCI x 4, ischemic cardiomyopathy, HTN, and hyperlipidemia who presented to the ER today with acute onset of right groin pain. He was in his normal state of health until this morning at 4 am when he developed right groin pain 1/10 in severity. He reports nausea without vomiting and he denies any abdominal pain, dysuria, hematuria, urinary frequency, or flank pain. He denies any constipation or diarrhea and his most recent bowel movement was last night. His right groin pain increased to 10/10 in severity which is what prompted him to come to [facility] for further evaluation. He has a history of left inguinal hernia repair and he noticed a bulge in the right groin this morning. On arrival to the ER he was hypertensive with a blood pressure of 241/95 and his initial labwork was remarkable for a BUN of 46, creatinine of 1.33, glucose of 237. CT of the abdomen and pelvis showed an early or partial close loop small bowel obstruction involving a right inguinal hernia, suspected developing fecal impaction in the rectosigmoid, and a 12 cm fat filled umbilical hernia extending through a 4 cm opening involving the ventral wall of the right supraumbilical region. He has received normal saline 500 ml IV and his right inguinal hernia was reduced in the ER. He is otherwise in stable condition and he has no other complaints today.          Past Medical History:    Diagnosis Date    Anxiety      Bilateral inguinal hernia      CHF (congestive heart failure)      Coronary artery disease      Gout      Hyperlipidemia      Hypertension      Hyperthyroidism      Ischemic cardiomyopathy      Thyroid nodule                 Past Surgical History:   Procedure Laterality Date    CHOLECYSTECTOMY         1990    CORONARY ANGIOPLASTY WITH STENT PLACEMENT        INGUINAL HERNIA REPAIR Left      KNEE ARTHROSCOPY Right       8/10/2018    RIGHT GREAT TOE AMPUTATION             Review of patient's allergies indicates:  No Known Allergies     No current facility-administered medications on file prior to encounter.           Current Outpatient Medications on File Prior to Encounter   Medication Sig    allopurinoL (ZYLOPRIM) 100 MG tablet Take 100 mg by mouth once daily.    atorvastatin (LIPITOR) 40 MG tablet Take 40 mg by mouth once daily.    ENTRESTO  mg per tablet Take 1 tablet by mouth 2 (two) times daily.    furosemide (LASIX) 20 MG tablet Take 20 mg by mouth 2 (two) times daily.    methIMAzole (TAPAZOLE) 5 MG Tab Take 5 mg by mouth once daily.    metoprolol succinate (TOPROL-XL) 50 MG 24 hr tablet Take 50 mg by mouth once daily.    [DISCONTINUED] amoxicillin-clavulanate 875-125mg (AUGMENTIN) 875-125 mg per tablet Take 1 tablet by mouth 2 (two) times daily.      Family History         Problem Relation (Age of Onset)     Cancer Mother     Ovarian cancer Sister     Pancreatic cancer Brother          Social History:        Tobacco Use    Smoking status: Never    Smokeless tobacco: Never   Substance and Sexual Activity    Alcohol use: Never    Drug use: Never    Sexual activity: Not on file      Review of Systems   Constitutional: Negative.    HENT: Negative.     Eyes: Negative.    Respiratory: Negative.     Cardiovascular: Negative.    Gastrointestinal:  Positive for nausea.   Endocrine: Negative.    Genitourinary: Positive for right groin pain.   Musculoskeletal: Negative.    Skin:  Negative.    Allergic/Immunologic: Negative.    Neurological: Negative.    Hematological: Negative.    Psychiatric/Behavioral: Negative.        Objective:      Vital Signs (Most Recent):  Temp: 97.8 °F (36.6 °C) (03/21/24 1442)  Pulse: 63 (03/21/24 1442)  Resp: 17 (03/21/24 1442)  BP: (!) 168/72 (03/21/24 1442)  SpO2: 99 % (03/21/24 1442) Vital Signs (24h Range):  Temp:  [97.6 °F (36.4 °C)-97.8 °F (36.6 °C)] 97.8 °F (36.6 °C)  Pulse:  [61-82] 63  Resp:  [17-18] 17  SpO2:  [97 %-100 %] 99 %  BP: (135-241)/(69-99) 168/72      Weight: 86.1 kg (189 lb 13.1 oz)  Body mass index is 25.74 kg/m².     Physical Exam  General - Elderly  male in no acute distress.  HEENT -  NCAT. No scleral icterus. Oropharynx clear. Mucous membranes moist.  Neck - No lymphadenopathy, thyromegaly, or JVD.  CVS - Regular rate and rhythm. No murmurs, rubs, or gallops.  Resp - Lungs are clear to auscultation bilaterally. No rales, wheeze, or rhonchi.   GI - Soft, nontender, nondistended, normoactive bowel sounds present. Umbilical hernia present.   Extremities - No clubbing, cyanosis, or edema.   Neuro - CN II through XII are grossly intact. No focal neurological deficits. Alert and oriented times four.   Psych - Normal affect.  Skin - No rash, skin tear, or ulcer.    - Reduced right inguinal hernia.        Significant Labs: All pertinent labs within the past 24 hours have been reviewed.  CBC:       Recent Labs   Lab 03/21/24  1132   WBC 5.76   HGB 13.1   HCT 40.0         CMP:       Recent Labs   Lab 03/21/24  1132      K 4.6   CO2 23   BUN 46.0*   CREATININE 1.33*   CALCIUM 9.3   ALBUMIN 3.8   BILITOT 0.8   ALKPHOS 161*   AST 31   ALT 41         Urine Studies:       Recent Labs   Lab 03/21/24  1311   APPEARANCEUA Clear   PROTEINUA 100*   BILIRUBINUA Negative   UROBILINOGEN 0.2   LEUKOCYTESUR Negative   RBCUA 3-5         Significant Imaging: I have reviewed all pertinent imaging results/findings within the past 24 hours.      Imaging Results                  CT Abdomen Pelvis With IV Contrast NO Oral Contrast (Final result)  Result time 03/21/24 13:57:04            Final result by Nicholas Torres MD (03/21/24 13:57:04)                           Impression:           1. Early or partial close loop small bowel obstruction involving a right inguinal hernia as described above.     2.  Suspect developing fecal impaction in the rectosigmoid.     3.  12 cm fat filled umbilical hernia extending through a 4 cm opening involving the ventral wall of the right supraumbilical region.     4.  Suspect gynecomastia, left greater than right.     n/a     CATEGORY: n/a     The following dose reduction techniques are used for all CT at [facility]:     1.   Automated exposure control.     2.   Adjustment of the mA and/or kV according to patient size.     3.   Use of iterative reconstruction technique.        Electronically signed by:     Nicholas Torres  Date:                                            03/21/2024  Time:                                            13:57                     Narrative:     EXAMINATION:  CT ABDOMEN PELVIS WITH IV CONTRAST     CLINICAL HISTORY:  Hernia, complicated;     TECHNIQUE:  Low dose axial images, sagittal and coronal reformations were obtained from the lung bases to the pubic symphysis following the IV administration of 93 mL of Omnipaque 300 .  Oral contrast was not given.     COMPARISON:  02/14/2018     FINDINGS:  Liver:  No clinically significant abnormalities noted.     Gallbladder/Biliary System:  The gallbladder is not visualized and I suspect has been removed.     Spleen:  No clinically significant abnormalities noted.     Adrenal glands:  No clinically significant abnormalities noted.     Pancreas:  The pancreas is atrophic.     Kidneys/Urinary Tract:  Both kidneys demonstrate renal cortical thinning peer     Urinary bladder:  No clinically significant abnormalities noted.     Prostate  gland/uterus and ovaries:  No clinically significant abnormalities noted.     GI tract:  A 12 cm fat filled hernia is present involving the right supra umbilical ventral wall and extends through an opening measuring 4 cm in diameter.  A loop of distal ileum extends into a right inguinal hernia.  The opening or neck of the hernia is approximately 5 cm while the overall size of the hernia is approximately 4 x 7 cm.  Fluid is present within loops of small bowel in the pelvic region which contain a few air-fluid levels.  The portion of small bowel in the neck of the hernia is collapsed bowel the loop of bowel within the hernia sac shows mild dilatation and filled with fluid.  Collectively this pattern suggests a closed loop partial or early obstruction.  Significant fecal content is present in the rectosigmoid suspicious for a developing fecal impaction.     Vascular structures:  Moderate atherosclerotic calcification is present involving the aorta and its branches.     Musculoskeletal structures:  Moderate bony demineralization is present with moderate degenerative findings involving the spine and to a lesser extent the SI joints and the hips.     Miscellaneous:  No clinically significant abnormalities noted.                                               Assessment/Plan:      * Right inguinal hernia  Continue pain control. He will remain NPO until evaluated by general surgery.     Hypertensive urgency  Continue entresto, metoprolol succinate, and IV hydralazine as needed.     Acute kidney injury  Continue IV fluids. Avoid further nephrotoxins.     Hyperglycemia  He denies any history of type II diabetes mellitus and a hemoglobin A1c will be ordered on admission. Continue SSI.     Coronary artery disease  Continue cardiac regimen.      Gout  Continue allopurinol.     Hyperlipidemia  Continue atorvastatin.     Ischemic cardiomyopathy  Continue entresto.     Hyperthyroidism  Continue methimazole.     CHF (congestive heart  failure)  Continue furosemide.         VTE Risk Mitigation (From admission, onward)              Ordered       enoxaparin injection 40 mg  Daily         03/21/24 1546       IP VTE HIGH RISK PATIENT  Once         03/21/24 1546       Place sequential compression device  Until discontinued         03/21/24 1546                             On 03/21/2024, patient should be placed in hospital observation services under my care.        This service was provided via telemedicine.  Type of Software: Audio/Visual.  Originating Site: [facility].  Distant Site: [place], [place].  His exam was performed with the assistance of his ER nurse.     Augusto Rojas MD  Department of Hospital Medicine  [facility]                           Pre-op Assessment    I have reviewed the Patient Summary Reports.     I have reviewed the Nursing Notes. I have reviewed the NPO Status.   I have reviewed the Medications.     Review of Systems  Anesthesia Hx:  No problems with previous Anesthesia             Denies Family Hx of Anesthesia complications.    Denies Personal Hx of Anesthesia complications.                    Social:  Non-Smoker       Hematology/Oncology:    Oncology Normal    -- Anemia:                                  EENT/Dental:  EENT/Dental Normal           Cardiovascular:  Exercise tolerance: poor   Hypertension  Past MI CAD   CABG/stent    CHF   PVD hyperlipidemia  NYHA Classification IV                           Pulmonary:  Pulmonary Normal                       Renal/:  Chronic Renal Disease, ARF, CKD                Hepatic/GI:  Hepatic/GI Normal                 Musculoskeletal:  Arthritis               Neurological:  Neurology Normal                                      Endocrine:    Hyperthyroidism         Dermatological:  Skin Normal    Psych:  Psychiatric Normal                    Physical Exam  General: Well nourished, Cooperative, Alert and Oriented    Airway:  Mallampati:  II / II  Mouth Opening: Normal  TM Distance: Normal  Tongue: Normal  Neck ROM: Normal ROM    Dental:  Intact  Multiple broken teeth      Anesthesia Plan  Type of Anesthesia, risks & benefits discussed:    Anesthesia Type: Gen ETT  Intra-op Monitoring Plan: Standard ASA Monitors  Post Op Pain Control Plan: multimodal analgesia  Induction:  IV  Airway Plan: Direct  Informed Consent: Informed consent signed with the Patient and all parties understand the risks and agree with anesthesia plan.  All questions answered. Patient consented to blood products? Yes  ASA Score: 4 Emergent  Day of Surgery Review of History & Physical: H&P Update referred to the surgeon/provider.I have interviewed and examined the patient. I have reviewed the patient's H&P dated: There are no significant changes.     Ready For Surgery From Anesthesia Perspective.     .

## 2024-03-23 VITALS
RESPIRATION RATE: 18 BRPM | SYSTOLIC BLOOD PRESSURE: 168 MMHG | DIASTOLIC BLOOD PRESSURE: 78 MMHG | OXYGEN SATURATION: 97 % | TEMPERATURE: 97 F | WEIGHT: 189.81 LBS | BODY MASS INDEX: 25.71 KG/M2 | HEART RATE: 72 BPM | HEIGHT: 72 IN

## 2024-03-23 LAB
ALBUMIN SERPL-MCNC: 2.9 G/DL (ref 3.4–5)
ALBUMIN/GLOB SERPL: 1.1 RATIO
ALP SERPL-CCNC: 135 UNIT/L (ref 50–144)
ALT SERPL-CCNC: 34 UNIT/L (ref 1–45)
ANION GAP SERPL CALC-SCNC: 4 MEQ/L (ref 2–13)
AST SERPL-CCNC: 40 UNIT/L (ref 17–59)
BASOPHILS # BLD AUTO: 0.02 X10(3)/MCL (ref 0.01–0.08)
BASOPHILS NFR BLD AUTO: 0.3 % (ref 0.1–1.2)
BILIRUB SERPL-MCNC: 1 MG/DL (ref 0–1)
BUN SERPL-MCNC: 30 MG/DL (ref 7–20)
CALCIUM SERPL-MCNC: 8.2 MG/DL (ref 8.4–10.2)
CHLORIDE SERPL-SCNC: 111 MMOL/L (ref 98–110)
CO2 SERPL-SCNC: 19 MMOL/L (ref 21–32)
CREAT SERPL-MCNC: 1.16 MG/DL (ref 0.66–1.25)
CREAT/UREA NIT SERPL: 26 (ref 12–20)
EOSINOPHIL # BLD AUTO: 0.09 X10(3)/MCL (ref 0.04–0.54)
EOSINOPHIL NFR BLD AUTO: 1.4 % (ref 0.7–7)
ERYTHROCYTE [DISTWIDTH] IN BLOOD BY AUTOMATED COUNT: 12.6 %
GFR SERPLBLD CREATININE-BSD FMLA CKD-EPI: 61 MLS/MIN/1.73/M2
GLOBULIN SER-MCNC: 2.6 GM/DL (ref 2–3.9)
GLUCOSE SERPL-MCNC: 125 MG/DL (ref 70–115)
HCT VFR BLD AUTO: 33.8 % (ref 36–52)
HGB BLD-MCNC: 11.3 G/DL (ref 13–18)
IMM GRANULOCYTES # BLD AUTO: 0.02 X10(3)/MCL (ref 0–0.03)
IMM GRANULOCYTES NFR BLD AUTO: 0.3 % (ref 0–0.5)
LYMPHOCYTES # BLD AUTO: 1.57 X10(3)/MCL (ref 1.32–3.57)
LYMPHOCYTES NFR BLD AUTO: 24.9 % (ref 20–55)
MCH RBC QN AUTO: 31 PG (ref 27–34)
MCHC RBC AUTO-ENTMCNC: 33.4 G/DL (ref 31–37)
MCV RBC AUTO: 92.6 FL (ref 79–99)
MONOCYTES # BLD AUTO: 0.65 X10(3)/MCL (ref 0.3–0.82)
MONOCYTES NFR BLD AUTO: 10.3 % (ref 4.7–12.5)
NEUTROPHILS # BLD AUTO: 3.95 X10(3)/MCL (ref 1.78–5.38)
NEUTROPHILS NFR BLD AUTO: 62.8 % (ref 37–73)
NRBC BLD AUTO-RTO: 0 %
OHS QRS DURATION: 104 MS
OHS QTC CALCULATION: 447 MS
PLATELET # BLD AUTO: 138 X10(3)/MCL (ref 140–371)
PMV BLD AUTO: 10.9 FL (ref 9.4–12.4)
POCT GLUCOSE: 136 MG/DL (ref 70–110)
POCT GLUCOSE: 141 MG/DL (ref 70–110)
POCT GLUCOSE: 176 MG/DL (ref 70–110)
POTASSIUM SERPL-SCNC: 3.6 MMOL/L (ref 3.5–5.1)
PROT SERPL-MCNC: 5.5 GM/DL (ref 6.3–8.2)
RBC # BLD AUTO: 3.65 X10(6)/MCL (ref 4–6)
SODIUM SERPL-SCNC: 134 MMOL/L (ref 135–145)
WBC # SPEC AUTO: 6.3 X10(3)/MCL (ref 4–11.5)

## 2024-03-23 PROCEDURE — 36415 COLL VENOUS BLD VENIPUNCTURE: CPT | Performed by: INTERNAL MEDICINE

## 2024-03-23 PROCEDURE — 85025 COMPLETE CBC W/AUTO DIFF WBC: CPT | Performed by: INTERNAL MEDICINE

## 2024-03-23 PROCEDURE — 25000003 PHARM REV CODE 250: Performed by: INTERNAL MEDICINE

## 2024-03-23 PROCEDURE — 80053 COMPREHEN METABOLIC PANEL: CPT | Performed by: INTERNAL MEDICINE

## 2024-03-23 PROCEDURE — 63600175 PHARM REV CODE 636 W HCPCS: Mod: JZ,JG | Performed by: SURGERY

## 2024-03-23 RX ADMIN — FUROSEMIDE 20 MG: 20 TABLET ORAL at 09:03

## 2024-03-23 RX ADMIN — METOPROLOL SUCCINATE 50 MG: 50 TABLET, EXTENDED RELEASE ORAL at 09:03

## 2024-03-23 RX ADMIN — SACUBITRIL AND VALSARTAN 1 TABLET: 97; 103 TABLET, FILM COATED ORAL at 09:03

## 2024-03-23 RX ADMIN — ATORVASTATIN CALCIUM 40 MG: 40 TABLET, FILM COATED ORAL at 09:03

## 2024-03-23 RX ADMIN — METHIMAZOLE 5 MG: 5 TABLET ORAL at 09:03

## 2024-03-23 RX ADMIN — ALLOPURINOL 100 MG: 100 TABLET ORAL at 09:03

## 2024-03-23 RX ADMIN — ACETAMINOPHEN 1000 MG: 1000 INJECTION, SOLUTION INTRAVENOUS at 05:03

## 2024-03-23 NOTE — DISCHARGE SUMMARY
Hospital Medicine  Discharge Summary    Patient Name: Luis Felipe Puri  MRN: [id number]  Admit Date: 3/21/2024  Discharge Date:  3/23/2024  Status: IP- Inpatient   Length of Stay: 1      PHYSICIANS   Admitting Physician: Augusto Rojas MD  Discharging Physician: Ralf Ingram MD.  Primary Care Physician: Shraddha Cardenas NP        DISCHARGE DIAGNOSES   S/p lap R inguinal hernia repair   Hyperglycemia  He denies any history of type II diabetes mellitus and a hemoglobin A1c will be ordered on admission. Continue SSI.     Acute kidney injury  Continue IV fluids. Avoid further nephrotoxins.     Coronary artery disease  Continue cardiac regimen.      Gout  Continue allopurinol.     Hyperlipidemia  Continue atorvastatin.     Ischemic cardiomyopathy  Continue entresto.     Hyperthyroidism  Continue methimazole.     CHF (congestive heart failure)  Continue furosemide.      Hypertensive urgency  Continue entresto, metoprolol succinate, and IV hydralazine as needed.    PROCEDURES   REPAIR, HERNIA, INGUINAL, LAPAROSCOPIC (Right)         HOSPITAL COURSE      Mr. Puri is a 88 year old  male with a history of CAD s/p PCI x 4, ischemic cardiomyopathy, HTN, and hyperlipidemia who presented to the ER today with acute onset of right groin pain. He was in his normal state of health until this morning at 4 am when he developed right groin pain 1/10 in severity. He reports nausea without vomiting and he denies any abdominal pain, dysuria, hematuria, urinary frequency, or flank pain. He denies any constipation or diarrhea and his most recent bowel movement was last night. His right groin pain increased to 10/10 in severity which is what prompted him to come to [facility] for further evaluation. He has a history of left inguinal hernia repair and he noticed a bulge in the right groin this morning. On arrival to the ER he was hypertensive with a blood pressure of 241/95 and his initial labwork  was remarkable for a BUN of 46, creatinine of 1.33, glucose of 237. CT of the abdomen and pelvis showed an early or partial close loop small bowel obstruction involving a right inguinal hernia, suspected developing fecal impaction in the rectosigmoid, and a 12 cm fat filled umbilical hernia extending through a 4 cm opening involving the ventral wall of the right supraumbilical region. He has received normal saline 500 ml IV and his right inguinal hernia was reduced in the ER. He is otherwise in stable condition and he has no other complaints today.     S/p lap R inguinal hernia repair       STATUS  Improved, Stable    DISPOSITION  Discharge to home     DIET  cardiac    ACTIVITY  As tolerated      FOLLOW-UP     Dr merchant 1 week     DISCHARGE MEDICATION RECONCILIATION        Medication List        CONTINUE taking these medications      allopurinoL 100 MG tablet  Commonly known as: ZYLOPRIM     atorvastatin 40 MG tablet  Commonly known as: LIPITOR     ENTRESTO  mg per tablet  Generic drug: sacubitriL-valsartan     furosemide 20 MG tablet  Commonly known as: LASIX     methIMAzole 5 MG Tab  Commonly known as: TAPAZOLE     metoprolol succinate 50 MG 24 hr tablet  Commonly known as: TOPROL-XL                PHYSICAL EXAM   VITALS: T 97.4 °F (36.3 °C)   BP (!) 168/78   P 72   RR 18   O2 97 %    Physical Exam  Vitals reviewed.   HENT:      Head: Normocephalic.   Cardiovascular:      Rate and Rhythm: Normal rate.   Pulmonary:      Effort: Pulmonary effort is normal.   Abdominal:      Comments: Lap site C/D/I   Neurological:      Mental Status: He is alert.              DIAGNOSITCS   CBC:   Recent Labs   Lab 03/21/24  1132 03/22/24  0445 03/23/24  0556   WBC 5.76 5.04 6.30   HGB 13.1 11.9* 11.3*   HCT 40.0 36.3 33.8*    147 138*       CMP:   Recent Labs   Lab 03/21/24  1132 03/22/24  0445 03/23/24  0556   CALCIUM 9.3 8.7 8.2*   ALBUMIN 3.8 3.3* 2.9*    140 134*   K 4.6 3.8 3.6   CO2 23 22 19*   BUN 46.0*  "37.0* 30.0*   CREATININE 1.33* 1.16 1.16   ALKPHOS 161* 137 135   ALT 41 34 34   AST 31 30 40   BILITOT 0.8 0.8 1.0     Estimated Creatinine Clearance: 48.3 mL/min (based on SCr of 1.16 mg/dL).    Labs within the past 24 hours have been reviewed.     COAG:  No results for input(s): "APTT", "INR", "PTT" in the last 168 hours.    CARDIAC ENZYMES: No results for input(s): "TROPONINI", "CPK", "CKMB" in the last 72 hours.     No results for input(s): "AMYLASE", "LIPASE" in the last 168 hours.    Recent Labs     03/22/24  0013 03/22/24  0558 03/22/24  1330 03/22/24  1936 03/22/24  2353 03/23/24  0556   POCTGLUCOSE 125* 127* 143* 136* 141* 136*        Microbiology Results (last 7 days)       ** No results found for the last 168 hours. **             No results for input(s): "CHOL", "TRIG", "HDL", "LDL" in the last 72 hours.   Lab Results   Component Value Date    HGBA1C 8.3 (H) 03/21/2024        X-Ray Chest AP Portable    Result Date: 3/22/2024  EXAMINATION: XR CHEST AP PORTABLE CLINICAL HISTORY: preop; TECHNIQUE: Single frontal view of the chest was performed. COMPARISON: January 1, 2022 FINDINGS: The cardiomediastinal silhouette remains mildly enlarged.  Pulmonary vasculature is within normal limits.  There is increased accentuation of the interstitial markings, chronic in nature.  No superimposed airspace infiltrate is seen.  No pleural effusion or pneumothorax are noted.     Chronic appearing changes are identified.  No acute superimposed infiltrate is seen. Electronically signed by: Nico Myrick Date:    03/22/2024 Time:    14:56    Echo    Result Date: 3/22/2024  Moderate LV dysfunction - EF 40% Grade I diastology Moderate AI Insufficient TR jet for RVSP estimation IVC not visualized     US Abdomen Limited    Result Date: 3/22/2024  EXAMINATION: US ABDOMEN LIMITED CLINICAL HISTORY: ABDOMEN, PELVIS, BELGICA GROIN; TECHNIQUE: Limited ultrasound of the right right and left groin was performed. COMPARISON: None. " FINDINGS: Sonographic evaluation of both groins with Valsalva demonstrates small fat containing inguinal hernias.  No bowel is noted.  On the right this measures 3.4 cm.  On the left this measures 1.2 cm.     Small fat containing inguinal hernias are identified bilaterally. Electronically signed by: Nico Myrick Date:    03/22/2024 Time:    13:00    US Abdomen Complete    Result Date: 3/22/2024  EXAMINATION: US ABDOMEN COMPLETE CLINICAL HISTORY: ABDOMEN; TECHNIQUE: Complete abdominal ultrasound (including pancreas, aorta, liver, gallbladder, common bile duct, IVC, kidneys, and spleen) was performed. COMPARISON: CT dated 1 day prior and ultrasound dated July 14 2018 FINDINGS: Pancreas: The visualized portions of pancreas appear normal. Aorta: No aneurysm. Liver: 13.8 cm, normal in size. Homogeneous parenchymal echotexture. No focal lesions. Gallbladder: Surgically absent Biliary system: 4.2 mm common bile duct.  No intrahepatic ductal dilatation. Inferior vena cava: Normal in appearance. Right kidney: 10.3 x 4.6 x 4.5 cm. No hydronephrosis. Left kidney: 5.9 x 6.5 x 5.3 cm. No hydronephrosis. Spleen: 10.5 cm.  Normal in size with homogeneous echotexture. Miscellaneous: No ascites.     No significant abnormality. Electronically signed by: Nico Myrick Date:    03/22/2024 Time:    12:53    US Pelvis Complete Non OB    Result Date: 3/22/2024  EXAMINATION: US PELVIS COMPLETE NON OB CLINICAL HISTORY: PELVIS; TECHNIQUE: Transabdominal imaging of the pelvis was performed COMPARISON: None FINDINGS: Sonographic evaluation of the pelvis demonstrates no solid or cystic masses.  No adnexal masses are seen.  No tenderness was noted.  The prostate measures 4.9 x 3.9 x 4.8 cm giving a volume of 48.6 cm.  The bladder appears unremarkable.     The prostate is mildly enlarged. Electronically signed by: Nico Myrick Date:    03/22/2024 Time:    12:50    CT Abdomen Pelvis With IV Contrast NO Oral Contrast    Result Date:  3/21/2024  EXAMINATION: CT ABDOMEN PELVIS WITH IV CONTRAST CLINICAL HISTORY: Hernia, complicated; TECHNIQUE: Low dose axial images, sagittal and coronal reformations were obtained from the lung bases to the pubic symphysis following the IV administration of 93 mL of Omnipaque 300 .  Oral contrast was not given. COMPARISON: 02/14/2018 FINDINGS: Liver:  No clinically significant abnormalities noted. Gallbladder/Biliary System:  The gallbladder is not visualized and I suspect has been removed. Spleen:  No clinically significant abnormalities noted. Adrenal glands:  No clinically significant abnormalities noted. Pancreas:  The pancreas is atrophic. Kidneys/Urinary Tract:  Both kidneys demonstrate renal cortical thinning peer Urinary bladder:  No clinically significant abnormalities noted. Prostate gland/uterus and ovaries:  No clinically significant abnormalities noted. GI tract:  A 12 cm fat filled hernia is present involving the right supra umbilical ventral wall and extends through an opening measuring 4 cm in diameter.  A loop of distal ileum extends into a right inguinal hernia.  The opening or neck of the hernia is approximately 5 cm while the overall size of the hernia is approximately 4 x 7 cm.  Fluid is present within loops of small bowel in the pelvic region which contain a few air-fluid levels.  The portion of small bowel in the neck of the hernia is collapsed bowel the loop of bowel within the hernia sac shows mild dilatation and filled with fluid.  Collectively this pattern suggests a closed loop partial or early obstruction.  Significant fecal content is present in the rectosigmoid suspicious for a developing fecal impaction. Vascular structures:  Moderate atherosclerotic calcification is present involving the aorta and its branches. Musculoskeletal structures:  Moderate bony demineralization is present with moderate degenerative findings involving the spine and to a lesser extent the SI joints and the  hips. Miscellaneous:  No clinically significant abnormalities noted.     1. Early or partial close loop small bowel obstruction involving a right inguinal hernia as described above. 2.  Suspect developing fecal impaction in the rectosigmoid. 3.  12 cm fat filled umbilical hernia extending through a 4 cm opening involving the ventral wall of the right supraumbilical region. 4.  Suspect gynecomastia, left greater than right. n/a CATEGORY: n/a The following dose reduction techniques are used for all CT at [facility]: 1.   Automated exposure control. 2.   Adjustment of the mA and/or kV according to patient size. 3.   Use of iterative reconstruction technique. Electronically signed by: Nicholas Torres Date:    03/21/2024 Time:    13:57      N/A         Discharge time: 33 minutes         Ralf Ingram MD  [facility]

## 2024-03-23 NOTE — PLAN OF CARE
Problem: Adult Inpatient Plan of Care  Goal: Plan of Care Review  Outcome: Met  Goal: Patient-Specific Goal (Individualized)  Outcome: Met  Goal: Absence of Hospital-Acquired Illness or Injury  Outcome: Met  Goal: Optimal Comfort and Wellbeing  Outcome: Met  Goal: Readiness for Transition of Care  Outcome: Met     Problem: Fluid and Electrolyte Imbalance (Acute Kidney Injury/Impairment)  Goal: Fluid and Electrolyte Balance  Outcome: Met     Problem: Oral Intake Inadequate (Acute Kidney Injury/Impairment)  Goal: Optimal Nutrition Intake  Outcome: Met     Problem: Renal Function Impairment (Acute Kidney Injury/Impairment)  Goal: Effective Renal Function  Outcome: Met     Problem: Fall Injury Risk  Goal: Absence of Fall and Fall-Related Injury  Outcome: Met     Problem: Fluid Deficit (Intestinal Obstruction)  Goal: Fluid Balance  Outcome: Met     Problem: Infection (Intestinal Obstruction)  Goal: Absence of Infection Signs and Symptoms  Outcome: Met     Problem: Nausea and Vomiting (Intestinal Obstruction)  Goal: Nausea and Vomiting Relief  Outcome: Met     Problem: Pain (Intestinal Obstruction)  Goal: Acceptable Pain Control  Outcome: Met     Problem: Bleeding (Hernia Repair)  Goal: Absence of Bleeding  Outcome: Met     Problem: Bowel Motility Impaired (Hernia Repair)  Goal: Effective Bowel Elimination  Outcome: Met     Problem: Fluid and Electrolyte Imbalance (Hernia Repair)  Goal: Fluid and Electrolyte Balance  Outcome: Met     Problem: Infection (Hernia Repair)  Goal: Absence of Infection Signs and Symptoms  Outcome: Met     Problem: Ongoing Anesthesia Effects (Hernia Repair)  Goal: Anesthesia/Sedation Recovery  Outcome: Met     Problem: Pain (Hernia Repair)  Goal: Optimal Pain Control and Function  Outcome: Met     Problem: Postoperative Nausea and Vomiting (Hernia Repair)  Goal: Nausea and Vomiting Relief  Outcome: Met     Problem: Postoperative Urinary Retention (Hernia Repair)  Goal: Effective Urinary  Elimination  Outcome: Met     Problem: Respiratory Compromise (Hernia Repair)  Goal: Effective Oxygenation and Ventilation  Outcome: Met

## 2024-03-23 NOTE — PLAN OF CARE
Problem: Adult Inpatient Plan of Care  Goal: Plan of Care Review  Outcome: Ongoing, Progressing  Goal: Patient-Specific Goal (Individualized)  Outcome: Ongoing, Progressing  Goal: Absence of Hospital-Acquired Illness or Injury  Outcome: Ongoing, Progressing  Goal: Optimal Comfort and Wellbeing  Outcome: Ongoing, Progressing  Goal: Readiness for Transition of Care  Outcome: Ongoing, Progressing     Problem: Fluid and Electrolyte Imbalance (Acute Kidney Injury/Impairment)  Goal: Fluid and Electrolyte Balance  Outcome: Ongoing, Progressing     Problem: Oral Intake Inadequate (Acute Kidney Injury/Impairment)  Goal: Optimal Nutrition Intake  Outcome: Ongoing, Progressing     Problem: Renal Function Impairment (Acute Kidney Injury/Impairment)  Goal: Effective Renal Function  Outcome: Ongoing, Progressing     Problem: Fall Injury Risk  Goal: Absence of Fall and Fall-Related Injury  Outcome: Ongoing, Progressing     Problem: Fluid Deficit (Intestinal Obstruction)  Goal: Fluid Balance  Outcome: Ongoing, Progressing     Problem: Infection (Intestinal Obstruction)  Goal: Absence of Infection Signs and Symptoms  Outcome: Ongoing, Progressing     Problem: Nausea and Vomiting (Intestinal Obstruction)  Goal: Nausea and Vomiting Relief  Outcome: Ongoing, Progressing     Problem: Pain (Intestinal Obstruction)  Goal: Acceptable Pain Control  Outcome: Ongoing, Progressing     Problem: Bleeding (Hernia Repair)  Goal: Absence of Bleeding  Outcome: Ongoing, Progressing     Problem: Fluid and Electrolyte Imbalance (Hernia Repair)  Goal: Fluid and Electrolyte Balance  Outcome: Ongoing, Progressing     Problem: Infection (Hernia Repair)  Goal: Absence of Infection Signs and Symptoms  Outcome: Ongoing, Progressing     Problem: Ongoing Anesthesia Effects (Hernia Repair)  Goal: Anesthesia/Sedation Recovery  Outcome: Ongoing, Progressing     Problem: Pain (Hernia Repair)  Goal: Optimal Pain Control and Function  Outcome: Ongoing,  Progressing     Problem: Postoperative Nausea and Vomiting (Hernia Repair)  Goal: Nausea and Vomiting Relief  Outcome: Ongoing, Progressing     Problem: Postoperative Urinary Retention (Hernia Repair)  Goal: Effective Urinary Elimination  Outcome: Ongoing, Progressing     Problem: Respiratory Compromise (Hernia Repair)  Goal: Effective Oxygenation and Ventilation  Outcome: Ongoing, Progressing

## 2024-03-23 NOTE — PROGRESS NOTES
[facility]  [facility]  Progress Note    Patient Name: Luis Felipe Puri  MRN: [id number]  Patient Class: IP- Inpatient   Admission Date: 3/21/2024  Length of Stay: 0 days  Attending Physician: Ralf Ingram MD  Primary Care Provider: Shraddha Cardenas NP        Subjective:     Principal Problem:Right inguinal hernia        HPI:  Mr. Puri is a 88 year old  male with a history of CAD s/p PCI x 4, ischemic cardiomyopathy, HTN, and hyperlipidemia who presented to the ER today with acute onset of right groin pain. He was in his normal state of health until this morning at 4 am when he developed right groin pain 1/10 in severity. He reports nausea without vomiting and he denies any abdominal pain, dysuria, hematuria, urinary frequency, or flank pain. He denies any constipation or diarrhea and his most recent bowel movement was last night. His right groin pain increased to 10/10 in severity which is what prompted him to come to [facility] for further evaluation. He has a history of left inguinal hernia repair and he noticed a bulge in the right groin this morning. On arrival to the ER he was hypertensive with a blood pressure of 241/95 and his initial labwork was remarkable for a BUN of 46, creatinine of 1.33, glucose of 237. CT of the abdomen and pelvis showed an early or partial close loop small bowel obstruction involving a right inguinal hernia, suspected developing fecal impaction in the rectosigmoid, and a 12 cm fat filled umbilical hernia extending through a 4 cm opening involving the ventral wall of the right supraumbilical region. He has received normal saline 500 ml IV and his right inguinal hernia was reduced in the ER. He is otherwise in stable condition and he has no other complaints today.    Overview/Hospital Course:  3/22/2024  Npo for surgery  Plan inguinal hernia repair     Past Medical History:   Diagnosis Date    Anxiety      Bilateral inguinal hernia     CHF (congestive heart failure)     Coronary artery disease     Gout     Hyperlipidemia     Hypertension     Hyperthyroidism     Ischemic cardiomyopathy     Thyroid nodule        Past Surgical History:   Procedure Laterality Date    CHOLECYSTECTOMY      1990    CORONARY ANGIOPLASTY WITH STENT PLACEMENT      INGUINAL HERNIA REPAIR Left     KNEE ARTHROSCOPY Right     8/10/2018    RIGHT GREAT TOE AMPUTATION         Review of patient's allergies indicates:  No Known Allergies    No current facility-administered medications on file prior to encounter.     Current Outpatient Medications on File Prior to Encounter   Medication Sig    allopurinoL (ZYLOPRIM) 100 MG tablet Take 100 mg by mouth once daily.    atorvastatin (LIPITOR) 40 MG tablet Take 40 mg by mouth once daily.    ENTRESTO  mg per tablet Take 1 tablet by mouth 2 (two) times daily.    furosemide (LASIX) 20 MG tablet Take 20 mg by mouth 2 (two) times daily.    methIMAzole (TAPAZOLE) 5 MG Tab Take 5 mg by mouth once daily.    metoprolol succinate (TOPROL-XL) 50 MG 24 hr tablet Take 50 mg by mouth once daily.     Family History       Problem Relation (Age of Onset)    Cancer Mother    Ovarian cancer Sister    Pancreatic cancer Brother        Social History:   Tobacco Use    Smoking status: Never    Smokeless tobacco: Never   Substance and Sexual Activity    Alcohol use: Never    Drug use: Never    Sexual activity: Not on file     Review of Systems   Constitutional: Negative.    HENT: Negative.     Eyes: Negative.    Respiratory: Negative.     Cardiovascular: Negative.    Gastrointestinal:  Positive for nausea.   Endocrine: Negative.    Genitourinary: Negative.    Musculoskeletal: Negative.    Skin: Negative.    Allergic/Immunologic: Negative.    Neurological: Negative.    Hematological: Negative.    Psychiatric/Behavioral: Negative.       Objective:     Vital Signs (Most Recent):  Temp: 97.6 °F (36.4 °C) (03/22/24 1947)  Pulse: 63  (03/22/24 1947)  Resp: 19 (03/22/24 1947)  BP: (!) 178/82 (03/22/24 1947)  SpO2: (!) 94 % (03/22/24 1947) Vital Signs (24h Range):  Temp:  [97.2 °F (36.2 °C)-98.9 °F (37.2 °C)] 97.6 °F (36.4 °C)  Pulse:  [60-92] 63  Resp:  [18-20] 19  SpO2:  [91 %-99 %] 94 %  BP: (128-207)/() 178/82     Weight: 86.1 kg (189 lb 13.1 oz)  Body mass index is 25.74 kg/m².     Physical Exam  General - Elderly  male in no acute distress.  HEENT -  NCAT. No scleral icterus. Oropharynx clear. Mucous membranes moist.  Neck - No lymphadenopathy, thyromegaly, or JVD.  CVS - Regular rate and rhythm. No murmurs, rubs, or gallops.  Resp - Lungs are clear to auscultation bilaterally. No rales, wheeze, or rhonchi.   GI - Soft, nontender, nondistended, normoactive bowel sounds present. Umbilical hernia present.   Extremities - No clubbing, cyanosis, or edema.   Neuro - CN II through XII are grossly intact. No focal neurological deficits. Alert and oriented times four.   Psych - Normal affect.  Skin - No rash, skin tear, or ulcer.    - Reduced right inguinal hernia.        Significant Labs: All pertinent labs within the past 24 hours have been reviewed.  CBC:   Recent Labs   Lab 03/21/24  1132 03/22/24  0445   WBC 5.76 5.04   HGB 13.1 11.9*   HCT 40.0 36.3    147       CMP:   Recent Labs   Lab 03/21/24  1132 03/22/24  0445    140   K 4.6 3.8   CO2 23 22   BUN 46.0* 37.0*   CREATININE 1.33* 1.16   CALCIUM 9.3 8.7   ALBUMIN 3.8 3.3*   BILITOT 0.8 0.8   ALKPHOS 161* 137   AST 31 30   ALT 41 34         Urine Studies:   Recent Labs   Lab 03/21/24  1311   APPEARANCEUA Clear   PROTEINUA 100*   BILIRUBINUA Negative   UROBILINOGEN 0.2   LEUKOCYTESUR Negative   RBCUA 3-5         Significant Imaging: I have reviewed all pertinent imaging results/findings within the past 24 hours.    Imaging Results              CT Abdomen Pelvis With IV Contrast NO Oral Contrast (Final result)  Result time 03/21/24 13:57:04      Final result by  Nicholas Torres MD (03/21/24 13:57:04)                   Impression:        1. Early or partial close loop small bowel obstruction involving a right inguinal hernia as described above.    2.  Suspect developing fecal impaction in the rectosigmoid.    3.  12 cm fat filled umbilical hernia extending through a 4 cm opening involving the ventral wall of the right supraumbilical region.    4.  Suspect gynecomastia, left greater than right.    n/a    CATEGORY: n/a    The following dose reduction techniques are used for all CT at [facility]:    1.   Automated exposure control.    2.   Adjustment of the mA and/or kV according to patient size.    3.   Use of iterative reconstruction technique.      Electronically signed by: Nicholas Torres  Date:    03/21/2024  Time:    13:57               Narrative:    EXAMINATION:  CT ABDOMEN PELVIS WITH IV CONTRAST    CLINICAL HISTORY:  Hernia, complicated;    TECHNIQUE:  Low dose axial images, sagittal and coronal reformations were obtained from the lung bases to the pubic symphysis following the IV administration of 93 mL of Omnipaque 300 .  Oral contrast was not given.    COMPARISON:  02/14/2018    FINDINGS:  Liver:  No clinically significant abnormalities noted.    Gallbladder/Biliary System:  The gallbladder is not visualized and I suspect has been removed.    Spleen:  No clinically significant abnormalities noted.    Adrenal glands:  No clinically significant abnormalities noted.    Pancreas:  The pancreas is atrophic.    Kidneys/Urinary Tract:  Both kidneys demonstrate renal cortical thinning peer    Urinary bladder:  No clinically significant abnormalities noted.    Prostate gland/uterus and ovaries:  No clinically significant abnormalities noted.    GI tract:  A 12 cm fat filled hernia is present involving the right supra umbilical ventral wall and extends through an opening measuring 4 cm in diameter.  A loop of distal ileum extends into a right inguinal  hernia.  The opening or neck of the hernia is approximately 5 cm while the overall size of the hernia is approximately 4 x 7 cm.  Fluid is present within loops of small bowel in the pelvic region which contain a few air-fluid levels.  The portion of small bowel in the neck of the hernia is collapsed bowel the loop of bowel within the hernia sac shows mild dilatation and filled with fluid.  Collectively this pattern suggests a closed loop partial or early obstruction.  Significant fecal content is present in the rectosigmoid suspicious for a developing fecal impaction.    Vascular structures:  Moderate atherosclerotic calcification is present involving the aorta and its branches.    Musculoskeletal structures:  Moderate bony demineralization is present with moderate degenerative findings involving the spine and to a lesser extent the SI joints and the hips.    Miscellaneous:  No clinically significant abnormalities noted.                                        Assessment/Plan:      * Right inguinal hernia  Continue pain control. He will remain NPO until evaluated by general surgery.  3/22/2024  S/p laparoscopic right inguinal hernia repair    Hyperglycemia  He denies any history of type II diabetes mellitus and a hemoglobin A1c will be ordered on admission. Continue SSI.    Acute kidney injury  Continue IV fluids. Avoid further nephrotoxins.    Coronary artery disease  Continue cardiac regimen.     Gout  Continue allopurinol.    Hyperlipidemia  Continue atorvastatin.    Ischemic cardiomyopathy  Continue entresto.    Hyperthyroidism  Continue methimazole.    CHF (congestive heart failure)  Continue furosemide.     Hypertensive urgency  Continue entresto, metoprolol succinate, and IV hydralazine as needed.      VTE Risk Mitigation (From admission, onward)           Ordered     enoxaparin injection 40 mg  Daily         03/21/24 1546     IP VTE HIGH RISK PATIENT  Once         03/21/24 1546                    Discharge  Planning   JENSEN: 3/25/2024     Code Status: Full Code   Is the patient medically ready for discharge?:     Reason for patient still in hospital (select all that apply): Treatment and Consult recommendations  Discharge Plan A: Home, Home Health                  Ralf Ingram MD  Department of Hospital Medicine   [facility]

## 2024-03-23 NOTE — NURSING
Pt discharged via wheelchair accompanied by RN, pt in stable condition and denies further needs at this time.

## 2024-03-23 NOTE — ASSESSMENT & PLAN NOTE
Continue pain control. He will remain NPO until evaluated by general surgery.  3/22/2024  S/p laparoscopic right inguinal hernia repair

## 2024-03-23 NOTE — SUBJECTIVE & OBJECTIVE
Past Medical History:   Diagnosis Date    Anxiety     Bilateral inguinal hernia     CHF (congestive heart failure)     Coronary artery disease     Gout     Hyperlipidemia     Hypertension     Hyperthyroidism     Ischemic cardiomyopathy     Thyroid nodule        Past Surgical History:   Procedure Laterality Date    CHOLECYSTECTOMY      1990    CORONARY ANGIOPLASTY WITH STENT PLACEMENT      INGUINAL HERNIA REPAIR Left     KNEE ARTHROSCOPY Right     8/10/2018    RIGHT GREAT TOE AMPUTATION         Review of patient's allergies indicates:  No Known Allergies    No current facility-administered medications on file prior to encounter.     Current Outpatient Medications on File Prior to Encounter   Medication Sig    allopurinoL (ZYLOPRIM) 100 MG tablet Take 100 mg by mouth once daily.    atorvastatin (LIPITOR) 40 MG tablet Take 40 mg by mouth once daily.    ENTRESTO  mg per tablet Take 1 tablet by mouth 2 (two) times daily.    furosemide (LASIX) 20 MG tablet Take 20 mg by mouth 2 (two) times daily.    methIMAzole (TAPAZOLE) 5 MG Tab Take 5 mg by mouth once daily.    metoprolol succinate (TOPROL-XL) 50 MG 24 hr tablet Take 50 mg by mouth once daily.     Family History       Problem Relation (Age of Onset)    Cancer Mother    Ovarian cancer Sister    Pancreatic cancer Brother        Social History:   Tobacco Use    Smoking status: Never    Smokeless tobacco: Never   Substance and Sexual Activity    Alcohol use: Never    Drug use: Never    Sexual activity: Not on file     Review of Systems   Constitutional: Negative.    HENT: Negative.     Eyes: Negative.    Respiratory: Negative.     Cardiovascular: Negative.    Gastrointestinal:  Positive for nausea.   Endocrine: Negative.    Genitourinary: Negative.    Musculoskeletal: Negative.    Skin: Negative.    Allergic/Immunologic: Negative.    Neurological: Negative.    Hematological: Negative.    Psychiatric/Behavioral: Negative.       Objective:     Vital Signs (Most  Recent):  Temp: 97.6 °F (36.4 °C) (03/22/24 1947)  Pulse: 63 (03/22/24 1947)  Resp: 19 (03/22/24 1947)  BP: (!) 178/82 (03/22/24 1947)  SpO2: (!) 94 % (03/22/24 1947) Vital Signs (24h Range):  Temp:  [97.2 °F (36.2 °C)-98.9 °F (37.2 °C)] 97.6 °F (36.4 °C)  Pulse:  [60-92] 63  Resp:  [18-20] 19  SpO2:  [91 %-99 %] 94 %  BP: (128-207)/() 178/82     Weight: 86.1 kg (189 lb 13.1 oz)  Body mass index is 25.74 kg/m².     Physical Exam  General - Elderly  male in no acute distress.  HEENT -  NCAT. No scleral icterus. Oropharynx clear. Mucous membranes moist.  Neck - No lymphadenopathy, thyromegaly, or JVD.  CVS - Regular rate and rhythm. No murmurs, rubs, or gallops.  Resp - Lungs are clear to auscultation bilaterally. No rales, wheeze, or rhonchi.   GI - Soft, nontender, nondistended, normoactive bowel sounds present. Umbilical hernia present.   Extremities - No clubbing, cyanosis, or edema.   Neuro - CN II through XII are grossly intact. No focal neurological deficits. Alert and oriented times four.   Psych - Normal affect.  Skin - No rash, skin tear, or ulcer.    - Reduced right inguinal hernia.        Significant Labs: All pertinent labs within the past 24 hours have been reviewed.  CBC:   Recent Labs   Lab 03/21/24  1132 03/22/24  0445   WBC 5.76 5.04   HGB 13.1 11.9*   HCT 40.0 36.3    147       CMP:   Recent Labs   Lab 03/21/24  1132 03/22/24  0445    140   K 4.6 3.8   CO2 23 22   BUN 46.0* 37.0*   CREATININE 1.33* 1.16   CALCIUM 9.3 8.7   ALBUMIN 3.8 3.3*   BILITOT 0.8 0.8   ALKPHOS 161* 137   AST 31 30   ALT 41 34         Urine Studies:   Recent Labs   Lab 03/21/24  1311   APPEARANCEUA Clear   PROTEINUA 100*   BILIRUBINUA Negative   UROBILINOGEN 0.2   LEUKOCYTESUR Negative   RBCUA 3-5         Significant Imaging: I have reviewed all pertinent imaging results/findings within the past 24 hours.    Imaging Results              CT Abdomen Pelvis With IV Contrast NO Oral Contrast (Final  result)  Result time 03/21/24 13:57:04      Final result by Nicholas Torres MD (03/21/24 13:57:04)                   Impression:        1. Early or partial close loop small bowel obstruction involving a right inguinal hernia as described above.    2.  Suspect developing fecal impaction in the rectosigmoid.    3.  12 cm fat filled umbilical hernia extending through a 4 cm opening involving the ventral wall of the right supraumbilical region.    4.  Suspect gynecomastia, left greater than right.    n/a    CATEGORY: n/a    The following dose reduction techniques are used for all CT at [facility]:    1.   Automated exposure control.    2.   Adjustment of the mA and/or kV according to patient size.    3.   Use of iterative reconstruction technique.      Electronically signed by: Nicholas Torres  Date:    03/21/2024  Time:    13:57               Narrative:    EXAMINATION:  CT ABDOMEN PELVIS WITH IV CONTRAST    CLINICAL HISTORY:  Hernia, complicated;    TECHNIQUE:  Low dose axial images, sagittal and coronal reformations were obtained from the lung bases to the pubic symphysis following the IV administration of 93 mL of Omnipaque 300 .  Oral contrast was not given.    COMPARISON:  02/14/2018    FINDINGS:  Liver:  No clinically significant abnormalities noted.    Gallbladder/Biliary System:  The gallbladder is not visualized and I suspect has been removed.    Spleen:  No clinically significant abnormalities noted.    Adrenal glands:  No clinically significant abnormalities noted.    Pancreas:  The pancreas is atrophic.    Kidneys/Urinary Tract:  Both kidneys demonstrate renal cortical thinning peer    Urinary bladder:  No clinically significant abnormalities noted.    Prostate gland/uterus and ovaries:  No clinically significant abnormalities noted.    GI tract:  A 12 cm fat filled hernia is present involving the right supra umbilical ventral wall and extends through an opening measuring 4 cm in  diameter.  A loop of distal ileum extends into a right inguinal hernia.  The opening or neck of the hernia is approximately 5 cm while the overall size of the hernia is approximately 4 x 7 cm.  Fluid is present within loops of small bowel in the pelvic region which contain a few air-fluid levels.  The portion of small bowel in the neck of the hernia is collapsed bowel the loop of bowel within the hernia sac shows mild dilatation and filled with fluid.  Collectively this pattern suggests a closed loop partial or early obstruction.  Significant fecal content is present in the rectosigmoid suspicious for a developing fecal impaction.    Vascular structures:  Moderate atherosclerotic calcification is present involving the aorta and its branches.    Musculoskeletal structures:  Moderate bony demineralization is present with moderate degenerative findings involving the spine and to a lesser extent the SI joints and the hips.    Miscellaneous:  No clinically significant abnormalities noted.

## 2024-03-24 NOTE — PROGRESS NOTES
[facility]  Cardiology  Progress Note    Patient Name: Luis Felipe Puri  MRN: [id number]  Admission Date: 3/21/2024  Hospital Length of Stay: 1 days  Code Status: Prior   Attending Physician: No att. providers found   Primary Care Physician: Shraddha Cardenas NP  Expected Discharge Date: 3/23/2024  Principal Problem:Right inguinal hernia    Subjective:       88-year-old gentleman, personal history of severe LV dysfunction, CAD, remote PCI, hypertension, chronic renal disease, anemia, hyperthyroid-admitted with symptomatic right inguinal hernia in need of surgical intervention.  Cardiology consulted for preop risk assessment.  Patient  well known to me.  No reported chest pain, PND, orthopnea, symptomatic palpitation, syncope.      Post op day 1 post RIH Sx  Did well   No reported cardiac issues     Preop risk assessment for symptomatic right inguinal hernia surgical intervention  Personal history of severe LV dysfunction  Hypertension  Chronic renal disease  CAD prior history of remote PCI   Personal history of hyperthyroid   History of gout.      Objective:     Vital Signs (Most Recent):  Temp: 97.4 °F (36.3 °C) (03/23/24 0706)  Pulse: 72 (03/23/24 0923)  Resp: 18 (03/23/24 0706)  BP: (!) 168/78 (03/23/24 0923)  SpO2: 97 % (03/23/24 0706) Vital Signs (24h Range):  Temp:  [97.4 °F (36.3 °C)-98.3 °F (36.8 °C)] 97.4 °F (36.3 °C)  Pulse:  [67-74] 72  Resp:  [17-18] 18  SpO2:  [97 %-98 %] 97 %  BP: (145-176)/(69-90) 168/78     Weight: 86.1 kg (189 lb 13.1 oz)  Body mass index is 25.74 kg/m².     SpO2: 97 %         Intake/Output Summary (Last 24 hours) at 3/23/2024 0654  Last data filed at 3/23/2024 0610  Gross per 24 hour   Intake 1100.83 ml   Output 750 ml   Net 350.83 ml          Physical Exam  Constitutional:       General: He is not in acute distress.  HENT:      Head: Normocephalic and atraumatic.      Nose: No congestion.      Mouth/Throat:      Mouth: Mucous membranes are dry.   Eyes:       Extraocular Movements: Extraocular movements intact.      Pupils: Pupils are equal, round, and reactive to light.   Cardiovascular:      Rate and Rhythm: Normal rate. , soft systolic murmur left sternal border Regular rate and rhythm, no JVD, gallop,  Pulmonary:      Effort: Pulmonary effort is normal.      Breath sounds: Normal breath sounds.   Abdominal:      Palpations: Abdomen is soft.none tender  Genitourinary:     Comments: + R IH post op - site dry  Musculoskeletal:      Cervical back: Neck supple.      Right lower leg: No edema.   Skin:     General: Skin is warm and dry.   Neurological:      General: No focal deficit present.      Mental Status: He is alert.   Psychiatric:         Mood and Affect: Mood normal.               Significant Labs: CMP   Recent Labs   Lab 03/22/24  0445 03/23/24  0556    134*   K 3.8 3.6   CO2 22 19*   BUN 37.0* 30.0*   CREATININE 1.16 1.16   CALCIUM 8.7 8.2*   ALBUMIN 3.3* 2.9*   BILITOT 0.8 1.0   ALKPHOS 137 135   AST 30 40   ALT 34 34    and CBC   Recent Labs   Lab 03/22/24  0445 03/23/24  0556   WBC 5.04 6.30   HGB 11.9* 11.3*   HCT 36.3 33.8*    138*       EKG - sinus no acute changes  PCXR - no active lobar infiltrate  Echo - EF 40%       Assessment and Plan:         Preop risk assessment for symptomatic right inguinal hernia surgical intervention  Personal history of severe LV dysfunction  Hypertension  Chronic renal disease /prerenal component  CAD prior history of remote PCI   Personal history of hyperthyroid   History of gout.     Moderate risk for above surgical intervention  Echo reviewed per MD EF 40%-markedly improved from past EF    Preop/post beta blocker/Entresto/diuretics  Post op care   Telemetry       Appreciate Dr Miller, Dr Ingram's help / consult           Joel Hirsch MD  Cardiology  [facility]

## 2024-03-24 NOTE — SUBJECTIVE & OBJECTIVE
ROS  Objective:     Vital Signs (Most Recent):  Temp: 97.4 °F (36.3 °C) (03/23/24 0706)  Pulse: 72 (03/23/24 0923)  Resp: 18 (03/23/24 0706)  BP: (!) 168/78 (03/23/24 0923)  SpO2: 97 % (03/23/24 0706) Vital Signs (24h Range):  Temp:  [97.4 °F (36.3 °C)-98.3 °F (36.8 °C)] 97.4 °F (36.3 °C)  Pulse:  [67-74] 72  Resp:  [17-18] 18  SpO2:  [97 %-98 %] 97 %  BP: (145-176)/(69-90) 168/78     Weight: 86.1 kg (189 lb 13.1 oz)  Body mass index is 25.74 kg/m².     SpO2: 97 %         Intake/Output Summary (Last 24 hours) at 3/23/2024 2319  Last data filed at 3/23/2024 0610  Gross per 24 hour   Intake 1100.83 ml   Output 750 ml   Net 350.83 ml       Lines/Drains/Airways       Peripheral Intravenous Line  Duration                  Peripheral IV - Single Lumen 03/22/24 1623 22 G Anterior;Left Forearm 1 day         Peripheral IV - Single Lumen 03/22/24 1639 20 G Anterior;Distal;Right Forearm 1 day                       Physical Exam       Significant Labs: CMP   Recent Labs   Lab 03/22/24  0445 03/23/24  0556    134*   K 3.8 3.6   CO2 22 19*   BUN 37.0* 30.0*   CREATININE 1.16 1.16   CALCIUM 8.7 8.2*   ALBUMIN 3.3* 2.9*   BILITOT 0.8 1.0   ALKPHOS 137 135   AST 30 40   ALT 34 34    and CBC   Recent Labs   Lab 03/22/24  0445 03/23/24  0556   WBC 5.04 6.30   HGB 11.9* 11.3*   HCT 36.3 33.8*    138*

## 2024-06-14 ENCOUNTER — LAB REQUISITION (OUTPATIENT)
Dept: LAB | Facility: HOSPITAL | Age: 89
End: 2024-06-14
Payer: MEDICARE

## 2024-06-14 DIAGNOSIS — Z12.11 ENCOUNTER FOR SCREENING FOR MALIGNANT NEOPLASM OF COLON: ICD-10-CM

## 2024-06-14 LAB
COLOR STL: NORMAL
CONSISTENCY STL: NORMAL
HEMOCCULT SP1 STL QL: NEGATIVE
HEMOCCULT SP2 STL QL: NEGATIVE
HEMOCCULT SP3 STL QL: NEGATIVE

## 2024-06-14 PROCEDURE — 82270 OCCULT BLOOD FECES: CPT | Performed by: SURGERY

## 2024-06-15 ENCOUNTER — DOCUMENT SCAN (OUTPATIENT)
Dept: HOME HEALTH SERVICES | Facility: HOSPITAL | Age: 89
End: 2024-06-15
Payer: MEDICARE

## 2024-06-24 PROBLEM — N17.9 ACUTE KIDNEY INJURY: Status: RESOLVED | Noted: 2024-03-21 | Resolved: 2024-06-24

## 2024-07-12 ENCOUNTER — DOCUMENT SCAN (OUTPATIENT)
Dept: HOME HEALTH SERVICES | Facility: HOSPITAL | Age: 89
End: 2024-07-12
Payer: MEDICARE

## 2024-07-22 PROCEDURE — G0179 MD RECERTIFICATION HHA PT: HCPCS | Mod: ,,, | Performed by: NURSE PRACTITIONER

## 2024-08-22 ENCOUNTER — EXTERNAL HOME HEALTH (OUTPATIENT)
Dept: HOME HEALTH SERVICES | Facility: HOSPITAL | Age: 89
End: 2024-08-22
Payer: MEDICARE

## 2024-10-22 PROCEDURE — 80048 BASIC METABOLIC PNL TOTAL CA: CPT | Performed by: INTERNAL MEDICINE

## 2024-11-18 ENCOUNTER — TELEPHONE (OUTPATIENT)
Dept: FAMILY MEDICINE | Facility: CLINIC | Age: 89
End: 2024-11-18
Payer: MEDICARE

## 2024-11-18 NOTE — TELEPHONE ENCOUNTER
I spoke with pts wife and she stated pt does not walk very well with his knees always giving him problems. She stated she is also not doing well herself due to recent surgeries. She stated she will get with family and try to get someone to bring him in for an appt. Transferred to [place] to schedule

## 2024-11-20 ENCOUNTER — OFFICE VISIT (OUTPATIENT)
Dept: FAMILY MEDICINE | Facility: CLINIC | Age: 89
End: 2024-11-20
Payer: MEDICARE

## 2024-11-20 ENCOUNTER — TELEPHONE (OUTPATIENT)
Dept: FAMILY MEDICINE | Facility: CLINIC | Age: 89
End: 2024-11-20

## 2024-11-20 ENCOUNTER — HOSPITAL ENCOUNTER (INPATIENT)
Facility: HOSPITAL | Age: 89
LOS: 2 days | Discharge: HOSPICE | DRG: 641 | End: 2024-11-22
Attending: SURGERY | Admitting: FAMILY MEDICINE
Payer: MEDICARE

## 2024-11-20 VITALS
BODY MASS INDEX: 23.84 KG/M2 | HEART RATE: 75 BPM | HEIGHT: 72 IN | TEMPERATURE: 96 F | SYSTOLIC BLOOD PRESSURE: 108 MMHG | WEIGHT: 176 LBS | DIASTOLIC BLOOD PRESSURE: 64 MMHG | OXYGEN SATURATION: 96 %

## 2024-11-20 DIAGNOSIS — E87.5 HYPERKALEMIA: Primary | ICD-10-CM

## 2024-11-20 DIAGNOSIS — E87.5 HYPERKALEMIA: ICD-10-CM

## 2024-11-20 DIAGNOSIS — I48.91 ATRIAL FIBRILLATION WITH SLOW VENTRICULAR RESPONSE: ICD-10-CM

## 2024-11-20 DIAGNOSIS — R53.1 WEAKNESS GENERALIZED: Primary | ICD-10-CM

## 2024-11-20 DIAGNOSIS — N18.9 ACUTE RENAL FAILURE SUPERIMPOSED ON CHRONIC KIDNEY DISEASE: ICD-10-CM

## 2024-11-20 DIAGNOSIS — I50.9 CONGESTIVE HEART FAILURE, UNSPECIFIED HF CHRONICITY, UNSPECIFIED HEART FAILURE TYPE: ICD-10-CM

## 2024-11-20 DIAGNOSIS — E87.20 METABOLIC ACIDOSIS: ICD-10-CM

## 2024-11-20 DIAGNOSIS — Z45.2 ENCOUNTER FOR PERIPHERAL LINE PLACEMENT: ICD-10-CM

## 2024-11-20 DIAGNOSIS — N18.9 ACUTE RENAL FAILURE SUPERIMPOSED ON CHRONIC KIDNEY DISEASE, UNSPECIFIED ACUTE RENAL FAILURE TYPE, UNSPECIFIED CKD STAGE: ICD-10-CM

## 2024-11-20 DIAGNOSIS — N18.32 CHRONIC KIDNEY DISEASE (CKD) STAGE G3B/A1, MODERATELY DECREASED GLOMERULAR FILTRATION RATE (GFR) BETWEEN 30-44 ML/MIN/1.73 SQUARE METER AND ALBUMINURIA CREATININE RATIO LESS THAN 30 MG/G: ICD-10-CM

## 2024-11-20 DIAGNOSIS — N17.9 ACUTE RENAL FAILURE SUPERIMPOSED ON CHRONIC KIDNEY DISEASE: ICD-10-CM

## 2024-11-20 DIAGNOSIS — D64.9 ANEMIA, UNSPECIFIED TYPE: ICD-10-CM

## 2024-11-20 DIAGNOSIS — N17.9 ACUTE RENAL FAILURE SUPERIMPOSED ON CHRONIC KIDNEY DISEASE, UNSPECIFIED ACUTE RENAL FAILURE TYPE, UNSPECIFIED CKD STAGE: ICD-10-CM

## 2024-11-20 DIAGNOSIS — R00.1 BRADYCARDIA: ICD-10-CM

## 2024-11-20 PROBLEM — E11.9 TYPE 2 DIABETES MELLITUS WITHOUT COMPLICATION, WITHOUT LONG-TERM CURRENT USE OF INSULIN: Status: ACTIVE | Noted: 2024-11-20

## 2024-11-20 PROBLEM — D69.6 THROMBOCYTOPENIA, UNSPECIFIED: Status: ACTIVE | Noted: 2024-11-20

## 2024-11-20 PROBLEM — D61.818 OTHER PANCYTOPENIA: Status: ACTIVE | Noted: 2024-11-20

## 2024-11-20 LAB
ALBUMIN SERPL-MCNC: 3.6 G/DL (ref 3.4–5)
ALBUMIN SERPL-MCNC: 3.9 G/DL (ref 3.4–5)
ALBUMIN/GLOB SERPL: 1.2 RATIO
ALBUMIN/GLOB SERPL: 1.3 RATIO
ALP SERPL-CCNC: 161 UNIT/L (ref 50–144)
ALP SERPL-CCNC: 167 UNIT/L (ref 50–144)
ALT SERPL-CCNC: 238 UNIT/L (ref 1–45)
ALT SERPL-CCNC: 248 UNIT/L (ref 1–45)
ANION GAP SERPL CALC-SCNC: 11 MEQ/L (ref 2–13)
ANION GAP SERPL CALC-SCNC: 9 MEQ/L (ref 2–13)
AST SERPL-CCNC: 52 UNIT/L (ref 17–59)
AST SERPL-CCNC: 56 UNIT/L (ref 17–59)
BASOPHILS # BLD AUTO: 0.02 X10(3)/MCL (ref 0.01–0.08)
BASOPHILS # BLD AUTO: 0.03 X10(3)/MCL (ref 0.01–0.08)
BASOPHILS NFR BLD AUTO: 0.2 % (ref 0.1–1.2)
BASOPHILS NFR BLD AUTO: 0.4 % (ref 0.1–1.2)
BILIRUB SERPL-MCNC: 0.5 MG/DL (ref 0–1)
BILIRUB SERPL-MCNC: 0.6 MG/DL (ref 0–1)
BNP BLD-MCNC: 3860 PG/ML (ref 0–124.9)
BUN SERPL-MCNC: 102 MG/DL (ref 7–20)
BUN SERPL-MCNC: 103 MG/DL (ref 7–20)
CALCIUM SERPL-MCNC: 9.6 MG/DL (ref 8.4–10.2)
CALCIUM SERPL-MCNC: 9.7 MG/DL (ref 8.4–10.2)
CHLORIDE SERPL-SCNC: 120 MMOL/L (ref 98–110)
CHLORIDE SERPL-SCNC: 121 MMOL/L (ref 98–110)
CO2 SERPL-SCNC: 6 MMOL/L (ref 21–32)
CO2 SERPL-SCNC: 7 MMOL/L (ref 21–32)
CREAT SERPL-MCNC: 2.67 MG/DL (ref 0.66–1.25)
CREAT SERPL-MCNC: 2.68 MG/DL (ref 0.66–1.25)
CREAT/UREA NIT SERPL: 38 (ref 12–20)
CREAT/UREA NIT SERPL: 38 (ref 12–20)
CTP QC/QA: YES
EOSINOPHIL # BLD AUTO: 0.19 X10(3)/MCL (ref 0.04–0.54)
EOSINOPHIL # BLD AUTO: 0.2 X10(3)/MCL (ref 0.04–0.54)
EOSINOPHIL NFR BLD AUTO: 2.2 % (ref 0.7–7)
EOSINOPHIL NFR BLD AUTO: 2.5 % (ref 0.7–7)
ERYTHROCYTE [DISTWIDTH] IN BLOOD BY AUTOMATED COUNT: 13.9 %
ERYTHROCYTE [DISTWIDTH] IN BLOOD BY AUTOMATED COUNT: 13.9 %
GFR SERPLBLD CREATININE-BSD FMLA CKD-EPI: 22 ML/MIN/1.73/M2
GFR SERPLBLD CREATININE-BSD FMLA CKD-EPI: 22 ML/MIN/1.73/M2
GLOBULIN SER-MCNC: 2.9 GM/DL (ref 2–3.9)
GLOBULIN SER-MCNC: 2.9 GM/DL (ref 2–3.9)
GLUCOSE SERPL-MCNC: 206 MG/DL (ref 70–115)
GLUCOSE SERPL-MCNC: 208 MG/DL (ref 70–115)
HBV SURFACE AG SERPL QL IA: NEGATIVE
HBV SURFACE AG SERPL QL IA: NORMAL
HCT VFR BLD AUTO: 37.8 % (ref 36–52)
HCT VFR BLD AUTO: 40.3 % (ref 36–52)
HGB BLD-MCNC: 12.3 G/DL (ref 13–18)
HGB BLD-MCNC: 12.5 G/DL (ref 13–18)
HGB, POC: 10.9 G/DL (ref 14–18)
IMM GRANULOCYTES # BLD AUTO: 0.01 X10(3)/MCL (ref 0–0.03)
IMM GRANULOCYTES # BLD AUTO: 0.02 X10(3)/MCL (ref 0–0.03)
IMM GRANULOCYTES NFR BLD AUTO: 0.1 % (ref 0–0.5)
IMM GRANULOCYTES NFR BLD AUTO: 0.2 % (ref 0–0.5)
LYMPHOCYTES # BLD AUTO: 2.53 X10(3)/MCL (ref 1.32–3.57)
LYMPHOCYTES # BLD AUTO: 2.6 X10(3)/MCL (ref 1.32–3.57)
LYMPHOCYTES NFR BLD AUTO: 29.6 % (ref 20–55)
LYMPHOCYTES NFR BLD AUTO: 32.5 % (ref 20–55)
MAGNESIUM SERPL-MCNC: 2 MG/DL (ref 1.8–2.4)
MCH RBC QN AUTO: 29.9 PG (ref 27–34)
MCH RBC QN AUTO: 30.5 PG (ref 27–34)
MCHC RBC AUTO-ENTMCNC: 31 G/DL (ref 31–37)
MCHC RBC AUTO-ENTMCNC: 32.5 G/DL (ref 31–37)
MCV RBC AUTO: 93.8 FL (ref 79–99)
MCV RBC AUTO: 96.4 FL (ref 79–99)
MONOCYTES # BLD AUTO: 0.7 X10(3)/MCL (ref 0.3–0.82)
MONOCYTES # BLD AUTO: 0.8 X10(3)/MCL (ref 0.3–0.82)
MONOCYTES NFR BLD AUTO: 8.7 % (ref 4.7–12.5)
MONOCYTES NFR BLD AUTO: 9.4 % (ref 4.7–12.5)
NEUTROPHILS # BLD AUTO: 4.48 X10(3)/MCL (ref 1.78–5.38)
NEUTROPHILS # BLD AUTO: 4.97 X10(3)/MCL (ref 1.78–5.38)
NEUTROPHILS NFR BLD AUTO: 56 % (ref 37–73)
NEUTROPHILS NFR BLD AUTO: 58.2 % (ref 37–73)
NRBC BLD AUTO-RTO: 0 %
PHOSPHATE SERPL-MCNC: 4.4 MG/DL (ref 2.5–4.9)
PLATELET # BLD AUTO: 152 X10(3)/MCL (ref 140–371)
PLATELET # BLD AUTO: 169 X10(3)/MCL (ref 140–371)
PMV BLD AUTO: 10.9 FL (ref 9.4–12.4)
PMV BLD AUTO: 11.8 FL (ref 9.4–12.4)
POCT GLUCOSE: 198 MG/DL (ref 70–110)
POTASSIUM SERPL-SCNC: 7.6 MMOL/L (ref 3.5–5.1)
POTASSIUM SERPL-SCNC: 7.9 MMOL/L (ref 3.5–5.1)
PROT SERPL-MCNC: 6.5 GM/DL (ref 6.3–8.2)
PROT SERPL-MCNC: 6.8 GM/DL (ref 6.3–8.2)
RBC # BLD AUTO: 4.03 X10(6)/MCL (ref 4–6)
RBC # BLD AUTO: 4.18 X10(6)/MCL (ref 4–6)
SODIUM SERPL-SCNC: 137 MMOL/L (ref 136–145)
SODIUM SERPL-SCNC: 137 MMOL/L (ref 136–145)
TROPONIN I SERPL-MCNC: 0.04 NG/ML (ref 0–0.03)
TSH SERPL-ACNC: 1.97 UIU/ML (ref 0.36–3.74)
WBC # BLD AUTO: 8.01 X10(3)/MCL (ref 4–11.5)
WBC # BLD AUTO: 8.54 X10(3)/MCL (ref 4–11.5)

## 2024-11-20 PROCEDURE — 93010 ELECTROCARDIOGRAM REPORT: CPT | Mod: ,,, | Performed by: INTERNAL MEDICINE

## 2024-11-20 PROCEDURE — 96361 HYDRATE IV INFUSION ADD-ON: CPT

## 2024-11-20 PROCEDURE — 84443 ASSAY THYROID STIM HORMONE: CPT | Performed by: SURGERY

## 2024-11-20 PROCEDURE — 20000000 HC ICU ROOM

## 2024-11-20 PROCEDURE — 80100014 HC HEMODIALYSIS 1:1

## 2024-11-20 PROCEDURE — 85025 COMPLETE CBC W/AUTO DIFF WBC: CPT | Performed by: SURGERY

## 2024-11-20 PROCEDURE — C1752 CATH,HEMODIALYSIS,SHORT-TERM: HCPCS

## 2024-11-20 PROCEDURE — 84484 ASSAY OF TROPONIN QUANT: CPT | Performed by: SURGERY

## 2024-11-20 PROCEDURE — 63600175 PHARM REV CODE 636 W HCPCS: Performed by: SURGERY

## 2024-11-20 PROCEDURE — 84100 ASSAY OF PHOSPHORUS: CPT | Performed by: SURGERY

## 2024-11-20 PROCEDURE — 5A1D70Z PERFORMANCE OF URINARY FILTRATION, INTERMITTENT, LESS THAN 6 HOURS PER DAY: ICD-10-PCS | Performed by: FAMILY MEDICINE

## 2024-11-20 PROCEDURE — 51702 INSERT TEMP BLADDER CATH: CPT

## 2024-11-20 PROCEDURE — 83735 ASSAY OF MAGNESIUM: CPT | Performed by: SURGERY

## 2024-11-20 PROCEDURE — 87340 HEPATITIS B SURFACE AG IA: CPT | Performed by: FAMILY MEDICINE

## 2024-11-20 PROCEDURE — 96375 TX/PRO/DX INJ NEW DRUG ADDON: CPT

## 2024-11-20 PROCEDURE — 93005 ELECTROCARDIOGRAM TRACING: CPT

## 2024-11-20 PROCEDURE — 80053 COMPREHEN METABOLIC PANEL: CPT | Performed by: SURGERY

## 2024-11-20 PROCEDURE — 99285 EMERGENCY DEPT VISIT HI MDM: CPT | Mod: 25

## 2024-11-20 PROCEDURE — 25000003 PHARM REV CODE 250: Mod: JZ,JG | Performed by: SURGERY

## 2024-11-20 PROCEDURE — 96374 THER/PROPH/DIAG INJ IV PUSH: CPT

## 2024-11-20 RX ORDER — MORPHINE SULFATE 2 MG/ML
2 INJECTION, SOLUTION INTRAMUSCULAR; INTRAVENOUS ONCE
Status: COMPLETED | OUTPATIENT
Start: 2024-11-20 | End: 2024-11-20

## 2024-11-20 RX ORDER — FUROSEMIDE 10 MG/ML
20 INJECTION INTRAMUSCULAR; INTRAVENOUS ONCE
Status: COMPLETED | OUTPATIENT
Start: 2024-11-20 | End: 2024-11-20

## 2024-11-20 RX ORDER — INDOMETHACIN 25 MG/1
100 CAPSULE ORAL ONCE
Status: COMPLETED | OUTPATIENT
Start: 2024-11-20 | End: 2024-11-20

## 2024-11-20 RX ORDER — ENOXAPARIN SODIUM 100 MG/ML
30 INJECTION SUBCUTANEOUS EVERY 24 HOURS
Status: DISCONTINUED | OUTPATIENT
Start: 2024-11-21 | End: 2024-11-22 | Stop reason: HOSPADM

## 2024-11-20 RX ORDER — CALCIUM GLUCONATE IN 0.9% NACL 1 G/100 ML
1 PLASTIC BAG, INJECTION (ML) INTRAVENOUS ONCE
Status: COMPLETED | OUTPATIENT
Start: 2024-11-20 | End: 2024-11-20

## 2024-11-20 RX ORDER — FAMOTIDINE 10 MG/ML
20 INJECTION INTRAVENOUS DAILY
Status: DISCONTINUED | OUTPATIENT
Start: 2024-11-21 | End: 2024-11-22

## 2024-11-20 RX ADMIN — SODIUM BICARBONATE 100 MEQ: 84 INJECTION, SOLUTION INTRAVENOUS at 09:11

## 2024-11-20 RX ADMIN — INSULIN HUMAN 5 UNITS: 100 INJECTION, SOLUTION PARENTERAL at 07:11

## 2024-11-20 RX ADMIN — FUROSEMIDE 20 MG: 10 INJECTION, SOLUTION INTRAMUSCULAR; INTRAVENOUS at 07:11

## 2024-11-20 RX ADMIN — CALCIUM GLUCONATE 1 G: 10 INJECTION, SOLUTION INTRAVENOUS at 07:11

## 2024-11-20 RX ADMIN — MORPHINE SULFATE 2 MG: 2 INJECTION, SOLUTION INTRAMUSCULAR; INTRAVENOUS at 09:11

## 2024-11-20 RX ADMIN — SODIUM CHLORIDE 1000 ML: 9 INJECTION, SOLUTION INTRAVENOUS at 07:11

## 2024-11-20 RX ADMIN — DEXTROSE MONOHYDRATE 25 G: 25 INJECTION, SOLUTION INTRAVENOUS at 07:11

## 2024-11-20 NOTE — ED PROVIDER NOTES
Encounter Date: 11/20/2024       History     Chief Complaint   Patient presents with    Abnormal Lab    Weakness     Pt presented to ED per EMS with c/o abnormal lab work from PCP'S office and generalized weakness.      88 YO WM W/ ABNORMAL LABORATORY VALUES REFERRED TO ER FOR RECHECK.  +C/O DIFFUSE FATIGUE.  NO OTHER C/O.      Review of patient's allergies indicates:  No Known Allergies  Past Medical History:   Diagnosis Date    Anxiety     Bilateral inguinal hernia     CHF (congestive heart failure)     Coronary artery disease     Gout     Hyperlipidemia     Hypertension     Hyperthyroidism     Ischemic cardiomyopathy     Thyroid nodule      Past Surgical History:   Procedure Laterality Date    CHOLECYSTECTOMY      1990    CORONARY ANGIOPLASTY WITH STENT PLACEMENT      INGUINAL HERNIA REPAIR Left     KNEE ARTHROSCOPY Right     8/10/2018    REPAIR, HERNIA, INGUINAL, LAPAROSCOPIC Right 3/22/2024    Procedure: REPAIR, HERNIA, INGUINAL, LAPAROSCOPIC;  Surgeon: Reggie Miller MD;  Location: [facility] OR;  Service: General;  Laterality: Right;    RIGHT GREAT TOE AMPUTATION       Family History   Problem Relation Name Age of Onset    Cancer Mother      Ovarian cancer Sister      Pancreatic cancer Brother       Social History     Tobacco Use    Smoking status: Never    Smokeless tobacco: Never   Substance Use Topics    Alcohol use: Never    Drug use: Never     Review of Systems   All other systems reviewed and are negative.      Physical Exam     Initial Vitals [11/20/24 1731]   BP Pulse Resp Temp SpO2   (!) 154/56 (!) 42 18 97.8 °F (36.6 °C) 98 %      MAP       --         Physical Exam    Constitutional: He appears well-developed and well-nourished. No distress.   HENT:   Head: Normocephalic and atraumatic.   Left Ear: External ear normal.   Nose: Nose normal. Mouth/Throat: Oropharynx is clear and moist. No oropharyngeal exudate.   Eyes: Conjunctivae and EOM are normal. Pupils are equal, round, and reactive to light.    Neck: Neck supple. No thyromegaly present. No tracheal deviation present. No JVD present.   Normal range of motion.  Cardiovascular:  Regular rhythm, normal heart sounds and intact distal pulses.     Exam reveals no gallop.       No murmur heard.  Pulmonary/Chest: Breath sounds normal. No stridor. No respiratory distress. He has no rhonchi. He has no rales.   Abdominal: Abdomen is soft. Bowel sounds are normal. He exhibits no distension and no mass. There is no abdominal tenderness. There is no rebound and no guarding.   Musculoskeletal:         General: No tenderness or edema. Normal range of motion.      Cervical back: Normal range of motion and neck supple.     Lymphadenopathy:     He has no cervical adenopathy.   Neurological: He is alert and oriented to person, place, and time. He has normal strength. No cranial nerve deficit or sensory deficit. GCS score is 15. GCS eye subscore is 4. GCS verbal subscore is 5. GCS motor subscore is 6.   Skin: Skin is warm. Capillary refill takes less than 2 seconds.   Psychiatric: He has a normal mood and affect. His behavior is normal. Judgment and thought content normal.       ED Course   Central Line    Date/Time: 11/20/2024 5:17 PM    Performed by: Paulo Pressley MD  Authorized by: Paulo Pressley MD    Location procedure was performed:  [facility]  Consent Done ?:  Yes  Time out complete?: Verified correct patient, procedure, equipment, staff, and site/side    Indications:  Hemodialysis and vascular access  Anesthesia method: NONE.  Description of findings:  NORMAL ANATOMY  Preparation:  Skin prepped with ChloraPrep  Skin prep agent dried: Skin prep agent completely dried prior to procedure    Sterile barriers: All five maximal sterile barriers used - gloves, gown, cap, mask and large sterile sheet    Hand hygiene: Hand hygiene performed immediately prior to central venous catheter insertion    Location:  Right femoral  Site  selection rationale:  APPROPRIATE (EXPEDIENT)  Catheter type:  Triple lumen  Catheter size:  12 Fr (12 Fr)  Inserted Catheter Length (cm):  16 (16 cm)  Ultrasound guidance: No    Manometry: No    Number of attempts:  1  Securement:  Line sutured, sterile dressing applied and blood return through all ports  Technical Procedures Used:  N/A  Significant surgical tasks conducted by the assistant(s):  N/A  Complications: No    Estimated blood loss (mL):  5  Specimens: No    Implants: No    Guidewire: guidewire removed intact, verified with nurse    Adverse Events:  None    Labs Reviewed   COMPREHENSIVE METABOLIC PANEL - Abnormal       Result Value    Sodium 137      Potassium 7.9 (*)     Chloride 120 (*)     CO2 6 (*)     Glucose 206 (*)     Blood Urea Nitrogen 102 (*)     Creatinine 2.67 (*)     Calcium 9.6      Protein Total 6.8      Albumin 3.9      Globulin 2.9      Albumin/Globulin Ratio 1.3      Bilirubin Total 0.5       (*)      (*)     AST 52      eGFR 22      Anion Gap 11.0      BUN/Creatinine Ratio 38 (*)    TROPONIN I - Abnormal    Troponin-I 0.038 (*)    CBC WITH DIFFERENTIAL - Abnormal    WBC 8.01      RBC 4.18      Hgb 12.5 (*)     Hct 40.3      MCV 96.4      MCH 29.9      MCHC 31.0      RDW 13.9      Platelet 152      MPV 10.9      Neut % 56.0      Lymph % 32.5      Mono % 8.7      Eos % 2.5      Basophil % 0.2      Lymph # 2.60      Neut # 4.48      Mono # 0.70      Eos # 0.20      Baso # 0.02      IG# 0.01      IG% 0.1     POCT GLUCOSE - Abnormal    POCT Glucose 198 (*)    MAGNESIUM - Normal    Magnesium Level 2.00     PHOSPHORUS - Normal    Phosphorus Level 4.4     TSH - Normal    TSH 1.970     CBC W/ AUTO DIFFERENTIAL    Narrative:     The following orders were created for panel order CBC Auto Differential.  Procedure                               Abnormality         Status                     ---------                               -----------         ------                     CBC with  Differential[[id number]]       Abnormal            Final result                 Please view results for these tests on the individual orders.   POCT GLUCOSE MONITORING CONTINUOUS     EKG Readings: (Independently Interpreted)   Initial Reading: No STEMI. Rhythm: Atrial Fibrillation. Heart Rate: 38. Ectopy: No Ectopy. ST Segments: Non-Specific ST Segment Depression. T Waves: Normal. Axis: Normal.   ATRIAL FIBRILLATION W/ SVR  NO ECTOPY         Imaging Results    None          Medications   sodium chloride 0.9% bolus 1,000 mL 1,000 mL (1,000 mLs Intravenous New Bag 11/20/24 1928)   furosemide injection 20 mg (20 mg Intravenous Given 11/20/24 1909)   dextrose 50% injection 25 g (25 g Intravenous Given 11/20/24 1913)   insulin regular injection 5 Units 0.05 mL (5 Units Intravenous Given 11/20/24 1920)   calcium gluconate in NS 1 G IVPB (premixed) (1 g Intravenous Given 11/20/24 1927)     Medical Decision Making             ED Course as of 11/20/24 2036 Wed Nov 20, 2024 1829 Potassium(!!): 7.9 [WV]   1829 Chloride(!): 120 [WV]   1829 CO2(!!): 6 [WV]   1829 Glucose(!): 206 [WV]   1829 BUN(!): 102 [WV]   1829 Creatinine(!): 2.67 [WV]   1829 ALP(!): 167 [WV]   1829 ALT(!): 238 [WV]   2035 Dr LOBO AGREES WITH ADMISSION FOLLOWING DISCUSSION WITH Dr ALMONTE WHO HAS ARRANGED EMERGENT DIALYSIS [WV]      ED Course User Index  [WV] Paulo Pressley MD                         TOTAL CRITICAL CARE TIME:  240 minutes    Clinical Impression:  Final diagnoses:  [R00.1] Bradycardia  [E87.5] Hyperkalemia (Primary)  [N17.9, N18.9] Acute renal failure superimposed on chronic kidney disease, unspecified acute renal failure type, unspecified CKD stage  [E87.20] Metabolic acidosis  [I48.91] Atrial fibrillation with slow ventricular response          ED Disposition Condition    Admit Stable                Paulo Pressley MD  11/20/24 2038

## 2024-11-20 NOTE — TELEPHONE ENCOUNTER
Geri from the lab called to give a critical on pt. Potassium and CO2. Provider is notified and MA was instructed to contact pt to go to ER right away. Wife is notified and so is ER nurse.

## 2024-11-20 NOTE — Clinical Note
Diagnosis: Hyperkalemia [143855]   Future Attending Provider: ROWAN SUN [[id number]]   Reason for IP Medical Treatment  (Clinical interventions that can only be accomplished in the IP setting? ) :: Treatment for inpatient acute life threatening illness   Estimated Length of Stay:: 2 midnights   Plans for Post-Acute care--if anticipated (pick the single best option):: A. No post acute care anticipated at this time   Special Needs:: No Special Needs [1]

## 2024-11-20 NOTE — PROGRESS NOTES
Patient ID: Luis Feilpe Puri  : 1935    Chief Complaint: follow up, referral for home health    Allergies: Patient has No Known Allergies.     History of Present Illness:  Patient presents to clinic for follow up and home health referral. Patient has not been seen in over a year. Wife reports has been followed by Dr Hirsch and Dr Stevenson (thyroid toxicosis). Wife states Dr Stevenson moved and Dr Fink in [place] is supposed to assume care, appt pending. Hernia repair by Dr Miller 3/2024- denies post op complications.   Last seen by Dr Hirsch 10/2024, labs ordered.   Labs reviewed from 10/22/2024 - decreased renal function, elevated BUN and creat.  GFR was 61 in 3/2024 and in 10/2024 GFR was 34.    Social History:  reports that he has never smoked. He has never used smokeless tobacco. He reports that he does not drink alcohol and does not use drugs.    Past Medical History:  has a past medical history of Anxiety, Bilateral inguinal hernia, CHF (congestive heart failure), Coronary artery disease, Gout, Hyperlipidemia, Hypertension, Hyperthyroidism, Ischemic cardiomyopathy, and Thyroid nodule.    Surgical History:   Past Surgical History:   Procedure Laterality Date    CHOLECYSTECTOMY          CORONARY ANGIOPLASTY WITH STENT PLACEMENT      INGUINAL HERNIA REPAIR Left     KNEE ARTHROSCOPY Right     8/10/2018    REPAIR, HERNIA, INGUINAL, LAPAROSCOPIC Right 3/22/2024    Procedure: REPAIR, HERNIA, INGUINAL, LAPAROSCOPIC;  Surgeon: Reggie Miller MD;  Location: [facility];  Service: General;  Laterality: Right;    RIGHT GREAT TOE AMPUTATION         Current Medications:  Current Outpatient Medications   Medication Instructions    allopurinoL (ZYLOPRIM) 100 mg, Daily    atorvastatin (LIPITOR) 40 mg, Daily    ENTRESTO  mg per tablet 1 tablet, 2 times daily    furosemide (LASIX) 20 mg, 2 times daily    methIMAzole (TAPAZOLE) 5 mg, Daily    metoprolol succinate (TOPROL-XL) 50 mg, Daily       Review of  "Systems   A comprehensive review of symptoms was completed and negative except as noted above.    Visit Vitals  /64 (Patient Position: Sitting)   Pulse 75   Temp 96.4 °F (35.8 °C)   Ht 6' (1.829 m)   Wt 79.8 kg (176 lb)   SpO2 96%   BMI 23.87 kg/m²       Physical Exam  Vitals and nursing note reviewed.   Constitutional:       General: He is not in acute distress.     Appearance: Normal appearance. He is not toxic-appearing.   HENT:      Head: Normocephalic and atraumatic.      Nose: Nose normal.      Mouth/Throat:      Mouth: Mucous membranes are moist.      Pharynx: Oropharynx is clear.   Eyes:      Extraocular Movements: Extraocular movements intact.      Conjunctiva/sclera: Conjunctivae normal.      Pupils: Pupils are equal, round, and reactive to light.   Cardiovascular:      Rate and Rhythm: Normal rate and regular rhythm.      Heart sounds: Normal heart sounds. No murmur heard.     No friction rub. No gallop.   Pulmonary:      Effort: Pulmonary effort is normal.      Breath sounds: Normal breath sounds.   Abdominal:      Palpations: Abdomen is soft.      Tenderness: There is no abdominal tenderness.   Musculoskeletal:         General: Normal range of motion.      Cervical back: Normal range of motion and neck supple.   Skin:     General: Skin is warm and dry.      Coloration: Skin is pale. Skin is not jaundiced.      Findings: No rash.   Neurological:      General: No focal deficit present.      Mental Status: He is alert and oriented to person, place, and time. Mental status is at baseline.   Psychiatric:         Mood and Affect: Mood normal.         Behavior: Behavior normal.         Thought Content: Thought content normal.         Judgment: Judgment normal.             Lipid Panel:  No results found for: "CHOL", "HDL", "DLDL", "TRIG", "TOTALCHOLEST"     Recent Results (from the past 24 hours)   POCT Hemoglobin - [facility]    Collection Time: 11/20/24 10:53 AM   Result Value Ref Range    Hemoglobin 10.9 (A) " 14 - 18 g/dL     Acceptable Yes    Comprehensive Metabolic Panel    Collection Time: 11/20/24 11:00 AM   Result Value Ref Range    Sodium 137 136 - 145 mmol/L    Potassium 7.6 (HH) 3.5 - 5.1 mmol/L    Chloride 121 (H) 98 - 110 mmol/L    CO2 7 (LL) 21 - 32 mmol/L    Glucose 208 (H) 70 - 115 mg/dL    Blood Urea Nitrogen 103 (H) 7.0 - 20.0 mg/dL    Creatinine 2.68 (H) 0.66 - 1.25 mg/dL    Calcium 9.7 8.4 - 10.2 mg/dL    Protein Total 6.5 6.3 - 8.2 gm/dL    Albumin 3.6 3.4 - 5.0 g/dL    Globulin 2.9 2.0 - 3.9 gm/dL    Albumin/Globulin Ratio 1.2 ratio    Bilirubin Total 0.6 0.0 - 1.0 mg/dL     (H) 50 - 144 unit/L     (H) 1 - 45 unit/L    AST 56 17 - 59 unit/L    eGFR 22 mL/min/1.73/m2    Anion Gap 9.0 2.0 - 13.0 mEq/L    BUN/Creatinine Ratio 38 (H) 12 - 20   NT-Pro Natriuretic Peptide    Collection Time: 11/20/24 11:00 AM   Result Value Ref Range    ProBNP 3,860.0 (H) 0.0 - 124.9 PG/ML   CBC with Differential    Collection Time: 11/20/24 11:00 AM   Result Value Ref Range    WBC 8.54 4.00 - 11.50 x10(3)/mcL    RBC 4.03 4.00 - 6.00 x10(6)/mcL    Hgb 12.3 (L) 13.0 - 18.0 g/dL    Hct 37.8 36.0 - 52.0 %    MCV 93.8 79.0 - 99.0 fL    MCH 30.5 27.0 - 34.0 pg    MCHC 32.5 31.0 - 37.0 g/dL    RDW 13.9 %    Platelet 169 140 - 371 x10(3)/mcL    MPV 11.8 9.4 - 12.4 fL    Neut % 58.2 37 - 73 %    Lymph % 29.6 20 - 55 %    Mono % 9.4 4.7 - 12.5 %    Eos % 2.2 0.7 - 7 %    Basophil % 0.4 0.1 - 1.2 %    Lymph # 2.53 1.32 - 3.57 x10(3)/mcL    Neut # 4.97 1.78 - 5.38 x10(3)/mcL    Mono # 0.80 0.3 - 0.82 x10(3)/mcL    Eos # 0.19 0.04 - 0.54 x10(3)/mcL    Baso # 0.03 0.01 - 0.08 x10(3)/mcL    IG# 0.02 0.0001 - 0.031 x10(3)/mcL    IG% 0.2 0 - 0.5 %    NRBC% 0.0 <=1 %   Hepatitis B Surface Antigen    Collection Time: 11/20/24 11:00 AM   Result Value Ref Range    Hep BsAg Interp Negative Negative    Hepatitis B Surface Antigen#     Comprehensive Metabolic Panel    Collection Time: 11/20/24  5:52 PM   Result Value  Ref Range    Sodium 137 136 - 145 mmol/L    Potassium 7.9 (HH) 3.5 - 5.1 mmol/L    Chloride 120 (H) 98 - 110 mmol/L    CO2 6 (LL) 21 - 32 mmol/L    Glucose 206 (H) 70 - 115 mg/dL    Blood Urea Nitrogen 102 (H) 7.0 - 20.0 mg/dL    Creatinine 2.67 (H) 0.66 - 1.25 mg/dL    Calcium 9.6 8.4 - 10.2 mg/dL    Protein Total 6.8 6.3 - 8.2 gm/dL    Albumin 3.9 3.4 - 5.0 g/dL    Globulin 2.9 2.0 - 3.9 gm/dL    Albumin/Globulin Ratio 1.3 ratio    Bilirubin Total 0.5 0.0 - 1.0 mg/dL     (H) 50 - 144 unit/L     (H) 1 - 45 unit/L    AST 52 17 - 59 unit/L    eGFR 22 mL/min/1.73/m2    Anion Gap 11.0 2.0 - 13.0 mEq/L    BUN/Creatinine Ratio 38 (H) 12 - 20   Magnesium    Collection Time: 11/20/24  5:52 PM   Result Value Ref Range    Magnesium Level 2.00 1.80 - 2.40 mg/dL   Troponin I    Collection Time: 11/20/24  5:52 PM   Result Value Ref Range    Troponin-I 0.038 (H) 0.000 - 0.034 ng/mL   Phosphorus    Collection Time: 11/20/24  5:52 PM   Result Value Ref Range    Phosphorus Level 4.4 2.5 - 4.9 mg/dL   TSH    Collection Time: 11/20/24  5:52 PM   Result Value Ref Range    TSH 1.970 0.360 - 3.740 uIU/mL   CBC with Differential    Collection Time: 11/20/24  5:52 PM   Result Value Ref Range    WBC 8.01 4.00 - 11.50 x10(3)/mcL    RBC 4.18 4.00 - 6.00 x10(6)/mcL    Hgb 12.5 (L) 13.0 - 18.0 g/dL    Hct 40.3 36.0 - 52.0 %    MCV 96.4 79.0 - 99.0 fL    MCH 29.9 27.0 - 34.0 pg    MCHC 31.0 31.0 - 37.0 g/dL    RDW 13.9 %    Platelet 152 140 - 371 x10(3)/mcL    MPV 10.9 9.4 - 12.4 fL    Neut % 56.0 37 - 73 %    Lymph % 32.5 20 - 55 %    Mono % 8.7 4.7 - 12.5 %    Eos % 2.5 0.7 - 7 %    Basophil % 0.2 0.1 - 1.2 %    Lymph # 2.60 1.32 - 3.57 x10(3)/mcL    Neut # 4.48 1.78 - 5.38 x10(3)/mcL    Mono # 0.70 0.3 - 0.82 x10(3)/mcL    Eos # 0.20 0.04 - 0.54 x10(3)/mcL    Baso # 0.02 0.01 - 0.08 x10(3)/mcL    IG# 0.01 0.0001 - 0.031 x10(3)/mcL    IG% 0.1 0 - 0.5 %   POCT glucose    Collection Time: 11/20/24  7:04 PM   Result Value Ref Range     POCT Glucose 198 (H) 70 - 110 mg/dL   POCT glucose    Collection Time: 11/21/24  1:10 AM   Result Value Ref Range    POCT Glucose 137 (H) 70 - 110 mg/dL   Comprehensive Metabolic Panel    Collection Time: 11/21/24  4:18 AM   Result Value Ref Range    Sodium 138 136 - 145 mmol/L    Potassium 5.2 (H) 3.5 - 5.1 mmol/L    Chloride 118 (H) 98 - 110 mmol/L    CO2 13 (L) 21 - 32 mmol/L    Glucose 133 (H) 70 - 115 mg/dL    Blood Urea Nitrogen 74 (H) 7.0 - 20.0 mg/dL    Creatinine 1.73 (H) 0.66 - 1.25 mg/dL    Calcium 8.7 8.4 - 10.2 mg/dL    Protein Total 5.8 (L) 6.3 - 8.2 gm/dL    Albumin 3.0 (L) 3.4 - 5.0 g/dL    Globulin 2.8 2.0 - 3.9 gm/dL    Albumin/Globulin Ratio 1.1 ratio    Bilirubin Total 1.0 0.0 - 1.0 mg/dL     50 - 144 unit/L     (H) 1 - 45 unit/L    AST 34 17 - 59 unit/L    eGFR 37 mL/min/1.73/m2    Anion Gap 7.0 2.0 - 13.0 mEq/L    BUN/Creatinine Ratio 43 (H) 12 - 20   Magnesium    Collection Time: 11/21/24  4:18 AM   Result Value Ref Range    Magnesium Level 1.80 1.80 - 2.40 mg/dL   Phosphorus    Collection Time: 11/21/24  4:18 AM   Result Value Ref Range    Phosphorus Level 3.4 2.5 - 4.9 mg/dL   CBC with Differential    Collection Time: 11/21/24  5:45 AM   Result Value Ref Range    WBC 8.07 4.00 - 11.50 x10(3)/mcL    RBC 3.47 (L) 4.00 - 6.00 x10(6)/mcL    Hgb 10.6 (L) 13.0 - 18.0 g/dL    Hct 31.6 (L) 36.0 - 52.0 %    MCV 91.1 79.0 - 99.0 fL    MCH 30.5 27.0 - 34.0 pg    MCHC 33.5 31.0 - 37.0 g/dL    RDW 13.7 %    Platelet 87 (L) 140 - 371 x10(3)/mcL    MPV 11.0 9.4 - 12.4 fL    Neut % 58.9 37 - 73 %    Lymph % 28.1 20 - 55 %    Mono % 9.7 4.7 - 12.5 %    Eos % 2.5 0.7 - 7 %    Basophil % 0.4 0.1 - 1.2 %    Lymph # 2.27 1.32 - 3.57 x10(3)/mcL    Neut # 4.76 1.78 - 5.38 x10(3)/mcL    Mono # 0.78 0.3 - 0.82 x10(3)/mcL    Eos # 0.20 0.04 - 0.54 x10(3)/mcL    Baso # 0.03 0.01 - 0.08 x10(3)/mcL    IG# 0.03 0.0001 - 0.031 x10(3)/mcL    IG% 0.4 0 - 0.5 %    NRBC% 0.0 <=1 %       Assessment & Plan:  1.  Weakness generalized    2. Hyperkalemia  Potassium 7.6     3. Anemia, unspecified type  -     POCT Hemoglobin - [facility]  -     CBC Auto Differential    4. Congestive heart failure, unspecified HF chronicity, unspecified heart failure type  -     NT-Pro Natriuretic Peptide    5. Chronic kidney disease (CKD) stage G3b/A1, moderately decreased glomerular filtration rate (GFR) between 30-44 mL/min/1.73 square meter and albuminuria creatinine ratio less than 30 mg/g  -     Comprehensive Metabolic Panel         Previous labs reviewed. Patient with weakness, increased SOB, pale - referred to ER. Patient declined. Requested labs to be drawn in clinic and notified with results.   Notified by lab K 7.6 and CO2- 7 - patient's wife notified and patient patient referred to ER for further evaluation and treatment.     Shraddha Cardenas, IVET-C

## 2024-11-21 PROBLEM — E87.5 HYPERKALEMIA: Status: ACTIVE | Noted: 2024-11-21

## 2024-11-21 PROBLEM — N18.9 ACUTE RENAL FAILURE SUPERIMPOSED ON CHRONIC KIDNEY DISEASE: Status: ACTIVE | Noted: 2024-03-21

## 2024-11-21 LAB
ALBUMIN SERPL-MCNC: 3 G/DL (ref 3.4–5)
ALBUMIN/GLOB SERPL: 1.1 RATIO
ALP SERPL-CCNC: 132 UNIT/L (ref 50–144)
ALT SERPL-CCNC: 174 UNIT/L (ref 1–45)
ANION GAP SERPL CALC-SCNC: 7 MEQ/L (ref 2–13)
AST SERPL-CCNC: 34 UNIT/L (ref 17–59)
BASOPHILS # BLD AUTO: 0.03 X10(3)/MCL (ref 0.01–0.08)
BASOPHILS NFR BLD AUTO: 0.4 % (ref 0.1–1.2)
BILIRUB SERPL-MCNC: 1 MG/DL (ref 0–1)
BUN SERPL-MCNC: 74 MG/DL (ref 7–20)
CALCIUM SERPL-MCNC: 8.7 MG/DL (ref 8.4–10.2)
CHLORIDE SERPL-SCNC: 118 MMOL/L (ref 98–110)
CO2 SERPL-SCNC: 13 MMOL/L (ref 21–32)
CREAT SERPL-MCNC: 1.73 MG/DL (ref 0.66–1.25)
CREAT/UREA NIT SERPL: 43 (ref 12–20)
EOSINOPHIL # BLD AUTO: 0.2 X10(3)/MCL (ref 0.04–0.54)
EOSINOPHIL NFR BLD AUTO: 2.5 % (ref 0.7–7)
ERYTHROCYTE [DISTWIDTH] IN BLOOD BY AUTOMATED COUNT: 13.7 %
GFR SERPLBLD CREATININE-BSD FMLA CKD-EPI: 37 ML/MIN/1.73/M2
GLOBULIN SER-MCNC: 2.8 GM/DL (ref 2–3.9)
GLUCOSE SERPL-MCNC: 133 MG/DL (ref 70–115)
HCT VFR BLD AUTO: 31.6 % (ref 36–52)
HGB BLD-MCNC: 10.6 G/DL (ref 13–18)
IMM GRANULOCYTES # BLD AUTO: 0.03 X10(3)/MCL (ref 0–0.03)
IMM GRANULOCYTES NFR BLD AUTO: 0.4 % (ref 0–0.5)
LYMPHOCYTES # BLD AUTO: 2.27 X10(3)/MCL (ref 1.32–3.57)
LYMPHOCYTES NFR BLD AUTO: 28.1 % (ref 20–55)
MAGNESIUM SERPL-MCNC: 1.8 MG/DL (ref 1.8–2.4)
MCH RBC QN AUTO: 30.5 PG (ref 27–34)
MCHC RBC AUTO-ENTMCNC: 33.5 G/DL (ref 31–37)
MCV RBC AUTO: 91.1 FL (ref 79–99)
MONOCYTES # BLD AUTO: 0.78 X10(3)/MCL (ref 0.3–0.82)
MONOCYTES NFR BLD AUTO: 9.7 % (ref 4.7–12.5)
NEUTROPHILS # BLD AUTO: 4.76 X10(3)/MCL (ref 1.78–5.38)
NEUTROPHILS NFR BLD AUTO: 58.9 % (ref 37–73)
NRBC BLD AUTO-RTO: 0 %
OHS QRS DURATION: 76 MS
OHS QTC CALCULATION: 365 MS
PHOSPHATE SERPL-MCNC: 3.4 MG/DL (ref 2.5–4.9)
PLATELET # BLD AUTO: 87 X10(3)/MCL (ref 140–371)
PMV BLD AUTO: 11 FL (ref 9.4–12.4)
POCT GLUCOSE: 137 MG/DL (ref 70–110)
POCT GLUCOSE: 146 MG/DL (ref 70–110)
POTASSIUM SERPL-SCNC: 5.2 MMOL/L (ref 3.5–5.1)
PROT SERPL-MCNC: 5.8 GM/DL (ref 6.3–8.2)
RBC # BLD AUTO: 3.47 X10(6)/MCL (ref 4–6)
SODIUM SERPL-SCNC: 138 MMOL/L (ref 136–145)
WBC # BLD AUTO: 8.07 X10(3)/MCL (ref 4–11.5)

## 2024-11-21 PROCEDURE — 85025 COMPLETE CBC W/AUTO DIFF WBC: CPT | Performed by: INTERNAL MEDICINE

## 2024-11-21 PROCEDURE — 84100 ASSAY OF PHOSPHORUS: CPT | Performed by: FAMILY MEDICINE

## 2024-11-21 PROCEDURE — 25000003 PHARM REV CODE 250: Performed by: INTERNAL MEDICINE

## 2024-11-21 PROCEDURE — 80053 COMPREHEN METABOLIC PANEL: CPT | Performed by: FAMILY MEDICINE

## 2024-11-21 PROCEDURE — 11000001 HC ACUTE MED/SURG PRIVATE ROOM

## 2024-11-21 PROCEDURE — 25000003 PHARM REV CODE 250: Performed by: FAMILY MEDICINE

## 2024-11-21 PROCEDURE — 83735 ASSAY OF MAGNESIUM: CPT | Performed by: FAMILY MEDICINE

## 2024-11-21 RX ORDER — OXYBUTYNIN CHLORIDE 5 MG/1
5 TABLET ORAL 3 TIMES DAILY
Status: DISCONTINUED | OUTPATIENT
Start: 2024-11-21 | End: 2024-11-22 | Stop reason: HOSPADM

## 2024-11-21 RX ORDER — MUPIROCIN 20 MG/G
OINTMENT TOPICAL 2 TIMES DAILY
Status: DISCONTINUED | OUTPATIENT
Start: 2024-11-21 | End: 2024-11-22 | Stop reason: HOSPADM

## 2024-11-21 RX ADMIN — OXYBUTYNIN CHLORIDE 5 MG: 5 TABLET ORAL at 09:11

## 2024-11-21 RX ADMIN — FAMOTIDINE 20 MG: 10 INJECTION, SOLUTION INTRAVENOUS at 08:11

## 2024-11-21 RX ADMIN — MUPIROCIN: 20 OINTMENT TOPICAL at 09:11

## 2024-11-21 NOTE — PROGRESS NOTES
[facility]-ICU  Wound Care    Patient Name:  Luis Felipe Puri   MRN:  [id number]  Date: 11/21/2024  Diagnosis: <principal problem not specified>    History:     Past Medical History:   Diagnosis Date    Anxiety     Bilateral inguinal hernia     CHF (congestive heart failure)     Coronary artery disease     Gout     Hyperlipidemia     Hypertension     Hyperthyroidism     Ischemic cardiomyopathy     Thyroid nodule        Social History     Socioeconomic History    Marital status:    Tobacco Use    Smoking status: Never    Smokeless tobacco: Never   Substance and Sexual Activity    Alcohol use: Never    Drug use: Never     Social Drivers of Health     Financial Resource Strain: Low Risk  (11/20/2024)    Overall Financial Resource Strain (CARDIA)     Difficulty of Paying Living Expenses: Not very hard   Food Insecurity: No Food Insecurity (11/20/2024)    Hunger Vital Sign     Worried About Running Out of Food in the Last Year: Never true     Ran Out of Food in the Last Year: Never true   Transportation Needs: No Transportation Needs (11/20/2024)    TRANSPORTATION NEEDS     Transportation : No   Physical Activity: Inactive (3/22/2024)    Exercise Vital Sign     Days of Exercise per Week: 0 days     Minutes of Exercise per Session: 0 min   Stress: No Stress Concern Present (11/20/2024)    [employer] [place] [employer] - Occupational Stress Questionnaire     Feeling of Stress : Not at all   Housing Stability: Low Risk  (11/20/2024)    Housing Stability Vital Sign     Unable to Pay for Housing in the Last Year: No     Homeless in the Last Year: No       Precautions:     Allergies as of 11/20/2024    (No Known Allergies)       WOC Assessment Details/Treatment        11/21/24 1031 11/21/24 1032   WOCN Assessment   WOCN Total Time (mins) 45  --    Visit Date 11/21/24  --    Visit Time 0930  --    Consult Type New  --    WOCN Speciality Wound  --    Wound pressure;moisture  --    Intervention  assessed  --    Skin Interventions   Pressure Reduction Devices pressure-redistributing mattress utilized;heel offloading device utilized  --    Pressure Reduction Techniques heels elevated off bed;weight shift assistance provided  --    Positioning   Body Position turned;left;side-lying  --    Positioning/Transfer Devices pillows;in use  --    Pressure Injury Prevention    Check Moisture Management Pad Done  --    Sacral Foam Dressing Peel back sacral foam dressing, assess skin and reapply  --    Heel protection technique Pillow  --         Wound 11/20/24 2230 Pressure Injury Right upper;posterior Buttocks #1   Date First Assessed/Time First Assessed: 11/20/24 2230   Present on Original Admission: Yes  Primary Wound Type: Pressure Injury  Side: Right  Orientation: upper;posterior  Location: Buttocks  Wound Number: #1  Is this injury device related?: No   Wound Image   --    Pressure Injury Stage 2  (more lateral wound) 2  (more medial wound)   Dressing Appearance Dry;Intact;Clean Clean;Dry;Intact   Drainage Amount None None   Appearance Pink;Moist Pink;Red;Moist   Periwound Area Redness Macerated   Wound Length (cm) 0.6 cm 0.5 cm   Wound Width (cm) 0.5 cm 1.5 cm   Wound Depth (cm) 0.1 cm 0.1 cm   Wound Volume (cm^3) 0.03 cm^3 0.075 cm^3   Wound Surface Area (cm^2) 0.3 cm^2 0.75 cm^2   Dressing Foam Foam   Dressing Change Due 11/25/24 11/25/24      Orders placed.  Pressure injury prevention interventions are in place.  Pt is at risk for pressure injuries.  Patient sitting a lot at home; on back/buttock region and wearing depends.      11/21/2024

## 2024-11-21 NOTE — H&P
[facility]  History & Physical    Patient Name: Luis Felipe Puri  MRN: [id number]  Patient Class: IP- Inpatient  Admission Date: 11/20/2024  Attending Physician: Mateo Perera MD  Primary Care Provider: Shraddha Cardenas NP         Patient information was obtained from via telemed    Subjective:     Principal Problem:<principal problem not specified>    Chief Complaint:   Chief Complaint   Patient presents with    Abnormal Lab    Weakness     Pt presented to ED per EMS with c/o abnormal lab work from PCP'S office and generalized weakness.         HPI: 89 yo male with hx Systolic heart failure reduced EF on Entresto, CKD, gout, who was sent to ER due to severe lab abnormality, elevated potassium 7.9, severe metabolic acidosis, in ED, he was give calcium gluconate, Bicarb, insulin with D50, back placed IVF was given, requested emergent dialysis, vasc cath was placed by ED and renal consulted. He is suppose to get HD. Pt C/o not feeling well, no Fever, no Chest pain,has SHABAZZ            Past Medical History:   Diagnosis Date    Anxiety     Bilateral inguinal hernia     CHF (congestive heart failure)     Coronary artery disease     Gout     Hyperlipidemia     Hypertension     Hyperthyroidism     Ischemic cardiomyopathy     Thyroid nodule        Past Surgical History:   Procedure Laterality Date    CHOLECYSTECTOMY      1990    CORONARY ANGIOPLASTY WITH STENT PLACEMENT      INGUINAL HERNIA REPAIR Left     KNEE ARTHROSCOPY Right     8/10/2018    REPAIR, HERNIA, INGUINAL, LAPAROSCOPIC Right 3/22/2024    Procedure: REPAIR, HERNIA, INGUINAL, LAPAROSCOPIC;  Surgeon: Reggie Miller MD;  Location: [facility];  Service: General;  Laterality: Right;    RIGHT GREAT TOE AMPUTATION         Review of patient's allergies indicates:  No Known Allergies    No current facility-administered medications on file prior to encounter.     Current Outpatient Medications on File Prior to Encounter    Medication Sig    allopurinoL (ZYLOPRIM) 100 MG tablet Take 100 mg by mouth once daily.    atorvastatin (LIPITOR) 40 MG tablet Take 40 mg by mouth once daily.    ENTRESTO  mg per tablet Take 1 tablet by mouth 2 (two) times daily.    furosemide (LASIX) 20 MG tablet Take 20 mg by mouth 2 (two) times daily.    methIMAzole (TAPAZOLE) 5 MG Tab Take 5 mg by mouth once daily.    metoprolol succinate (TOPROL-XL) 50 MG 24 hr tablet Take 50 mg by mouth once daily.     Family History       Problem Relation (Age of Onset)    Cancer Mother    Ovarian cancer Sister    Pancreatic cancer Brother          Tobacco Use    Smoking status: Never    Smokeless tobacco: Never   Substance and Sexual Activity    Alcohol use: Never    Drug use: Never    Sexual activity: Not on file     Review of Systems   Constitutional:  Positive for chills.   HENT: Negative.     Eyes: Negative.    Respiratory: Negative.     Cardiovascular:  Positive for leg swelling.   Gastrointestinal: Negative.    Endocrine: Negative.    Genitourinary: Negative.    Musculoskeletal:  Positive for myalgias.   Neurological: Negative.    Hematological: Negative.    Psychiatric/Behavioral: Negative.       Objective:     Vital Signs (Most Recent):  Temp: 98.2 °F (36.8 °C) (11/20/24 2100)  Pulse: 70 (11/20/24 2144)  Resp: (!) 23 (11/20/24 2144)  BP: (!) 153/65 (11/20/24 2144)  SpO2: 100 % (11/20/24 2144) Vital Signs (24h Range):  Temp:  [96.4 °F (35.8 °C)-98.2 °F (36.8 °C)] 98.2 °F (36.8 °C)  Pulse:  [42-75] 70  Resp:  [18-24] 23  SpO2:  [96 %-100 %] 100 %  BP: (108-178)/(41-95) 153/65     Weight: 87.5 kg (193 lb)  Body mass index is 26.18 kg/m².     Physical Exam  Constitutional:       Appearance: Normal appearance. He is normal weight.   HENT:      Mouth/Throat:      Pharynx: Oropharynx is clear.   Eyes:      Extraocular Movements: Extraocular movements intact.      Conjunctiva/sclera: Conjunctivae normal.      Pupils: Pupils are equal, round, and reactive to light.  "  Cardiovascular:      Rate and Rhythm: Normal rate and regular rhythm.      Pulses: Normal pulses.      Heart sounds: Normal heart sounds.   Pulmonary:      Effort: Pulmonary effort is normal.      Breath sounds: Normal breath sounds.   Abdominal:      General: Abdomen is flat. Bowel sounds are normal.      Palpations: Abdomen is soft.   Musculoskeletal:         General: Normal range of motion.      Right lower leg: Edema present.      Left lower leg: Edema present.   Skin:     General: Skin is warm and dry.   Neurological:      General: No focal deficit present.      Mental Status: He is alert and oriented to person, place, and time. Mental status is at baseline.   Psychiatric:         Mood and Affect: Mood normal.              CRANIAL NERVES     CN III, IV, VI   Pupils are equal, round, and reactive to light.       Significant Labs: All pertinent labs within the past 24 hours have been reviewed.  ABGs: No results for input(s): "PH", "PCO2", "HCO3", "POCSATURATED", "BE", "TOTALHB", "COHB", "METHB", "O2HB", "POCFIO2", "PO2" in the last 48 hours.  BMP:   Recent Labs   Lab 11/20/24  1752      K 7.9*   *   CO2 6*   *   CREATININE 2.67*   CALCIUM 9.6   MG 2.00     CBC:   Recent Labs   Lab 11/20/24  1053 11/20/24  1100 11/20/24  1752   WBC  --  8.54 8.01   HGB 10.9* 12.3* 12.5*   HCT  --  37.8 40.3   PLT  --  169 152     CMP:   Recent Labs   Lab 11/20/24  1100 11/20/24  1752    137   K 7.6* 7.9*   * 120*   CO2 7* 6*   * 102*   CREATININE 2.68* 2.67*   CALCIUM 9.7 9.6   ALBUMIN 3.6 3.9   BILITOT 0.6 0.5   ALKPHOS 161* 167*   AST 56 52   * 238*     Lactic Acid: No results for input(s): "LACTATE" in the last 48 hours.  Respiratory Culture: No results for input(s): "GSRESP", "RESPIRATORYC" in the last 48 hours.  Troponin:   Recent Labs   Lab 11/20/24  1752   TROPONINI 0.038*     TSH:   Recent Labs   Lab 11/20/24  1752   TSH 1.970       Significant Imaging:     Assessment/Plan: "   - Severe life threatening Hyperkalemia Pt got Calcium gluconate , insulin, D50 and IVF, back placed. Vas cath placed on RT groin, Renal consulted for HD  -Systolic Heart failure compensated hold Entresto, Metoprolol for now  -LEO acute on chronic, monitor going to get HD  -Gout will resume allopurinol   -Sacral decub stage 2 local wound care  -GERD add pepcid  - DVT ppx on Lovenox  CCT 35 mins  Pt is Full code, SDM his spouse Brissa Puri  Pt is seen and examined via telemed. Pt is in [facility]. I am in [place], [place]. Nursing staff assisted with evaluation. Software is Audio/Video  Pt is being admitted to the hospitalist service as inpatient for further eval and treatment  No notes have been filed under this hospital service.  Service: Hospital Medicine    VTE Risk Mitigation (From admission, onward)      None                       [facility]  Nephrology  Consult Note    Patient Name: Luis Felipe Puri  MRN: [id number]  Admission Date: 11/20/2024  Hospital Length of Stay: 0 days  Attending Provider: Maris Nixon MD   Primary Care Physician: Shraddha Cardenas NP  Principal Problem:<principal problem not specified>    Consults  Subjective:     HPI: 89-year-old gentleman with multiple medical problems was referred to the emergency room by his outpatient physician for abnormal labs. In the emergency room, he was found to have acute kidney injury on top of chronic kidney disease and critically high potassium of 7.9.  He was also found to have profound metabolic acidosis with a total CO2 of six  Because of the above values nephrology consultation was requested for emergent dialysis.   A temporary dialysis catheter has been placed in the femoral vein    Past Medical History:   Diagnosis Date    Anxiety     Bilateral inguinal hernia     CHF (congestive heart failure)     Coronary artery disease     Gout     Hyperlipidemia     Hypertension     Hyperthyroidism     Ischemic  cardiomyopathy     Thyroid nodule        Past Surgical History:   Procedure Laterality Date    CHOLECYSTECTOMY      1990    CORONARY ANGIOPLASTY WITH STENT PLACEMENT      INGUINAL HERNIA REPAIR Left     KNEE ARTHROSCOPY Right     8/10/2018    REPAIR, HERNIA, INGUINAL, LAPAROSCOPIC Right 3/22/2024    Procedure: REPAIR, HERNIA, INGUINAL, LAPAROSCOPIC;  Surgeon: Reggie Miller MD;  Location: [facility] OR;  Service: General;  Laterality: Right;    RIGHT GREAT TOE AMPUTATION         Review of patient's allergies indicates:  No Known Allergies  Current Facility-Administered Medications   Medication Frequency    morphine injection 2 mg Once    sodium bicarbonate solution 100 mEq Once     Current Outpatient Medications   Medication    allopurinoL (ZYLOPRIM) 100 MG tablet    atorvastatin (LIPITOR) 40 MG tablet    ENTRESTO  mg per tablet    furosemide (LASIX) 20 MG tablet    methIMAzole (TAPAZOLE) 5 MG Tab    metoprolol succinate (TOPROL-XL) 50 MG 24 hr tablet     Family History       Problem Relation (Age of Onset)    Cancer Mother    Ovarian cancer Sister    Pancreatic cancer Brother          Tobacco Use    Smoking status: Never    Smokeless tobacco: Never   Substance and Sexual Activity    Alcohol use: Never    Drug use: Never    Sexual activity: Not on file     Review of Systems  Objective:     Vital Signs (Most Recent):  Temp: 98 °F (36.7 °C) (11/20/24 1930)  Pulse: 63 (11/20/24 2004)  Resp: (!) 22 (11/20/24 2004)  BP: (!) 169/82 (11/20/24 2004)  SpO2: 100 % (11/20/24 2004)   Vital Signs (24h Range):  Temp:  [96.4 °F (35.8 °C)-98.1 °F (36.7 °C)] 98 °F (36.7 °C)  Pulse:  [42-75] 63  Resp:  [18-22] 22  SpO2:  [96 %-100 %] 100 %  BP: (108-178)/(41-95) 169/82       Weight: 87.5 kg (193 lb) (11/20/24 1731)  Body mass index is 26.18 kg/m².  Body surface area is 2.11 meters squared.    No intake/output data recorded.    Physical Exam    Significant Labs:  BMP:   Recent Labs   Lab 11/20/24  1752      K 7.9*   *    CO2 6*   *   CREATININE 2.67*   CALCIUM 9.6   MG 2.00     CBC:   Recent Labs   Lab 11/20/24  1752   WBC 8.01   RBC 4.18   HGB 12.5*   HCT 40.3      MCV 96.4   MCH 29.9   MCHC 31.0     CMP:   Recent Labs   Lab 11/20/24  1752   CALCIUM 9.6   ALBUMIN 3.9      K 7.9*   CO2 6*   *   *   CREATININE 2.67*   ALKPHOS 167*   *   AST 52   BILITOT 0.5     Significant Imaging:  Labs: Reviewed    Assessment/Plan:     There are no hospital problems to display for this patient.    A/P  1. acute kidney injury, likely hemodynamically mediated    2. Critical hyperkalemia.  Needs urgent, Dialysis     3. Severe metabolic acidosis.    4. history of congestive heart failure.    5. Admit to ICU.    Thank you for your consult. I will follow-up with patient. Please contact us if you have any additional questions.    Job Lopez MD  Nephrology  [facility]      Mateo Perera MD  Department of Hospital Medicine  [facility]

## 2024-11-21 NOTE — PLAN OF CARE
Physician ordered to consult hospice. Pt's family preference : [facility]. Referral made to [facility] per phone [phone] / fax [phone] , spoke with Trudi Montes.

## 2024-11-21 NOTE — SUBJECTIVE & OBJECTIVE
Past Medical History:   Diagnosis Date    Anxiety     Bilateral inguinal hernia     CHF (congestive heart failure)     Coronary artery disease     Gout     Hyperlipidemia     Hypertension     Hyperthyroidism     Ischemic cardiomyopathy     Thyroid nodule        Past Surgical History:   Procedure Laterality Date    CHOLECYSTECTOMY      1990    CORONARY ANGIOPLASTY WITH STENT PLACEMENT      INGUINAL HERNIA REPAIR Left     KNEE ARTHROSCOPY Right     8/10/2018    REPAIR, HERNIA, INGUINAL, LAPAROSCOPIC Right 3/22/2024    Procedure: REPAIR, HERNIA, INGUINAL, LAPAROSCOPIC;  Surgeon: Reggie Miller MD;  Location: [facility] OR;  Service: General;  Laterality: Right;    RIGHT GREAT TOE AMPUTATION         Review of patient's allergies indicates:  No Known Allergies    No current facility-administered medications on file prior to encounter.     Current Outpatient Medications on File Prior to Encounter   Medication Sig    allopurinoL (ZYLOPRIM) 100 MG tablet Take 100 mg by mouth once daily.    atorvastatin (LIPITOR) 40 MG tablet Take 40 mg by mouth once daily.    ENTRESTO  mg per tablet Take 1 tablet by mouth 2 (two) times daily.    furosemide (LASIX) 20 MG tablet Take 20 mg by mouth 2 (two) times daily.    methIMAzole (TAPAZOLE) 5 MG Tab Take 5 mg by mouth once daily.    metoprolol succinate (TOPROL-XL) 50 MG 24 hr tablet Take 50 mg by mouth once daily.     Family History       Problem Relation (Age of Onset)    Cancer Mother    Ovarian cancer Sister    Pancreatic cancer Brother          Tobacco Use    Smoking status: Never    Smokeless tobacco: Never   Substance and Sexual Activity    Alcohol use: Never    Drug use: Never    Sexual activity: Not on file     Review of Systems   Constitutional:  Positive for chills.   HENT: Negative.     Eyes: Negative.    Respiratory: Negative.     Cardiovascular:  Positive for leg swelling.   Gastrointestinal: Negative.    Endocrine: Negative.    Genitourinary: Negative.   "  Musculoskeletal:  Positive for myalgias.   Neurological: Negative.    Hematological: Negative.    Psychiatric/Behavioral: Negative.       Objective:     Vital Signs (Most Recent):  Temp: 98.1 °F (36.7 °C) (11/21/24 1101)  Pulse: (!) 55 (11/21/24 1101)  Resp: 15 (11/21/24 1101)  BP: (!) 110/45 (11/21/24 1101)  SpO2: 98 % (11/21/24 1101) Vital Signs (24h Range):  Temp:  [97.7 °F (36.5 °C)-98.2 °F (36.8 °C)] 98.1 °F (36.7 °C)  Pulse:  [42-85] 55  Resp:  [13-26] 15  SpO2:  [82 %-100 %] 98 %  BP: ()/() 110/45     Weight: 77.6 kg (171 lb 1.2 oz)  Body mass index is 23.2 kg/m².     Physical Exam  Constitutional:       Appearance: Normal appearance. He is normal weight.   HENT:      Mouth/Throat:      Pharynx: Oropharynx is clear.   Eyes:      Extraocular Movements: Extraocular movements intact.      Conjunctiva/sclera: Conjunctivae normal.      Pupils: Pupils are equal, round, and reactive to light.   Cardiovascular:      Rate and Rhythm: Normal rate and regular rhythm.      Pulses: Normal pulses.      Heart sounds: Normal heart sounds.   Pulmonary:      Effort: Pulmonary effort is normal.      Breath sounds: Normal breath sounds.   Abdominal:      General: Abdomen is flat. Bowel sounds are normal.      Palpations: Abdomen is soft.   Musculoskeletal:         General: Normal range of motion.      Right lower leg: Edema present.      Left lower leg: Edema present.   Skin:     General: Skin is warm and dry.   Neurological:      General: No focal deficit present.      Mental Status: He is alert and oriented to person, place, and time. Mental status is at baseline.   Psychiatric:         Mood and Affect: Mood normal.              CRANIAL NERVES     CN III, IV, VI   Pupils are equal, round, and reactive to light.       Significant Labs: All pertinent labs within the past 24 hours have been reviewed.  ABGs: No results for input(s): "PH", "PCO2", "HCO3", "POCSATURATED", "BE", "TOTALHB", "COHB", "METHB", "O2HB", " ""POCFIO2", "PO2" in the last 48 hours.  BMP:   Recent Labs   Lab 11/21/24 0418      K 5.2*   *   CO2 13*   BUN 74*   CREATININE 1.73*   CALCIUM 8.7   MG 1.80     CBC:   Recent Labs   Lab 11/20/24  1100 11/20/24  1752 11/21/24  0545   WBC 8.54 8.01 8.07   HGB 12.3* 12.5* 10.6*   HCT 37.8 40.3 31.6*    152 87*     CMP:   Recent Labs   Lab 11/20/24  1100 11/20/24 1752 11/21/24 0418    137 138   K 7.6* 7.9* 5.2*   * 120* 118*   CO2 7* 6* 13*   * 102* 74*   CREATININE 2.68* 2.67* 1.73*   CALCIUM 9.7 9.6 8.7   ALBUMIN 3.6 3.9 3.0*   BILITOT 0.6 0.5 1.0   ALKPHOS 161* 167* 132   AST 56 52 34   * 238* 174*     Lactic Acid: No results for input(s): "LACTATE" in the last 48 hours.  Respiratory Culture: No results for input(s): "GSRESP", "RESPIRATORYC" in the last 48 hours.  Troponin:   Recent Labs   Lab 11/20/24 1752   TROPONINI 0.038*     TSH:   Recent Labs   Lab 11/20/24 1752   TSH 1.970       Significant Imaging:     "

## 2024-11-21 NOTE — PROGRESS NOTES
[facility]  [facility]ult [facility]    Patient Name: Luis Felipe Puri  MRN: [id number]  Admission Date: 11/20/2024  Hospital Length of Stay: 0 days  Attending Provider: Maris Nixon MD   Primary Care Physician: Shraddha Cardenas NP  Principal Problem:<principal problem not specified>    Consults  Subjective:     HPI: 89-year-old gentleman with multiple medical problems was referred to the emergency room by his outpatient physician for abnormal labs. In the emergency room, he was found to have acute kidney injury on top of chronic kidney disease and critically high potassium of 7.9.  He was also found to have profound metabolic acidosis with a total CO2 of six  Because of the above values nephrology consultation was requested for emergent dialysis.   A temporary dialysis catheter has been placed in the femoral vein    Past Medical History:   Diagnosis Date    Anxiety     Bilateral inguinal hernia     CHF (congestive heart failure)     Coronary artery disease     Gout     Hyperlipidemia     Hypertension     Hyperthyroidism     Ischemic cardiomyopathy     Thyroid nodule        Past Surgical History:   Procedure Laterality Date    CHOLECYSTECTOMY      1990    CORONARY ANGIOPLASTY WITH STENT PLACEMENT      INGUINAL HERNIA REPAIR Left     KNEE ARTHROSCOPY Right     8/10/2018    REPAIR, HERNIA, INGUINAL, LAPAROSCOPIC Right 3/22/2024    Procedure: REPAIR, HERNIA, INGUINAL, LAPAROSCOPIC;  Surgeon: Reggie Miller MD;  Location: [facility];  Service: General;  Laterality: Right;    RIGHT GREAT TOE AMPUTATION         Review of patient's allergies indicates:  No Known Allergies  Current Facility-Administered Medications   Medication Frequency    morphine injection 2 mg Once    sodium bicarbonate solution 100 mEq Once     Current Outpatient Medications   Medication    allopurinoL (ZYLOPRIM) 100 MG tablet    atorvastatin (LIPITOR) 40 MG tablet    ENTRESTO  mg per  tablet    furosemide (LASIX) 20 MG tablet    methIMAzole (TAPAZOLE) 5 MG Tab    metoprolol succinate (TOPROL-XL) 50 MG 24 hr tablet     Family History     Problem Relation (Age of Onset)    Cancer Mother    Ovarian cancer Sister    Pancreatic cancer Brother        Tobacco Use    Smoking status: Never    Smokeless tobacco: Never   Substance and Sexual Activity    Alcohol use: Never    Drug use: Never    Sexual activity: Not on file     Review of Systems  Objective:     Vital Signs (Most Recent):  Temp: 98 °F (36.7 °C) (11/20/24 1930)  Pulse: 63 (11/20/24 2004)  Resp: (!) 22 (11/20/24 2004)  BP: (!) 169/82 (11/20/24 2004)  SpO2: 100 % (11/20/24 2004)   Vital Signs (24h Range):  Temp:  [96.4 °F (35.8 °C)-98.1 °F (36.7 °C)] 98 °F (36.7 °C)  Pulse:  [42-75] 63  Resp:  [18-22] 22  SpO2:  [96 %-100 %] 100 %  BP: (108-178)/(41-95) 169/82       Weight: 87.5 kg (193 lb) (11/20/24 1731)  Body mass index is 26.18 kg/m².  Body surface area is 2.11 meters squared.    No intake/output data recorded.    Physical Exam    Significant Labs:  BMP:   Recent Labs   Lab 11/20/24  1752      K 7.9*   *   CO2 6*   *   CREATININE 2.67*   CALCIUM 9.6   MG 2.00     CBC:   Recent Labs   Lab 11/20/24  1752   WBC 8.01   RBC 4.18   HGB 12.5*   HCT 40.3      MCV 96.4   MCH 29.9   MCHC 31.0     CMP:   Recent Labs   Lab 11/20/24  1752   CALCIUM 9.6   ALBUMIN 3.9      K 7.9*   CO2 6*   *   *   CREATININE 2.67*   ALKPHOS 167*   *   AST 52   BILITOT 0.5     Significant Imaging:  Labs: Reviewed    Assessment/Plan:     There are no hospital problems to display for this patient.    A/P  1. acute kidney injury, likely hemodynamically mediated    2. Critical hyperkalemia.  Needs urgent, Dialysis     3. Severe metabolic acidosis.    4. history of congestive heart failure.    5. Admit to ICU.    Thank you for your consult. I will follow-up with patient. Please contact us if you have any additional  questions.    Job Lopez MD  Nephrology  [facility]

## 2024-11-21 NOTE — HPI
89 yo male with hx Systolic heart failure reduced EF on Entresto, CKD, gout, who was sent to ER due to severe lab abnormality, elevated potassium 7.9, severe metabolic acidosis, in ED, he was give calcium gluconate, Bicarb, insulin with D50, back placed IVF was given, requested emergent dialysis, vasc cath was placed by ED and renal consulted. He is suppose to get HD. Pt C/o not feeling well, no Fever, no Chest pain,has SHABAZZ

## 2024-11-21 NOTE — NURSING
Pt transferred to room 212 via hospital bed accompanied by spouse and RN. Pt awake alert and oriented upon transport. No s/s of distress noted.

## 2024-11-21 NOTE — PLAN OF CARE
Problem: Infection  Goal: Absence of Infection Signs and Symptoms  Outcome: Progressing     Problem: Adult Inpatient Plan of Care  Goal: Plan of Care Review  Outcome: Progressing  Goal: Absence of Hospital-Acquired Illness or Injury  Outcome: Progressing  Goal: Optimal Comfort and Wellbeing  Outcome: Progressing  Goal: Readiness for Transition of Care  Outcome: Progressing     Problem: Fall Injury Risk  Goal: Absence of Fall and Fall-Related Injury  Outcome: Progressing     Problem: Skin Injury Risk Increased  Goal: Skin Health and Integrity  Outcome: Progressing     Problem: Hemodialysis  Goal: Safe, Effective Therapy Delivery  Outcome: Progressing  Goal: Effective Tissue Perfusion  Outcome: Progressing  Goal: Absence of Infection Signs and Symptoms  Outcome: Progressing     Problem: Wound  Goal: Optimal Coping  Outcome: Progressing  Goal: Optimal Functional Ability  Outcome: Progressing  Goal: Absence of Infection Signs and Symptoms  Outcome: Progressing  Goal: Improved Oral Intake  Outcome: Progressing  Goal: Optimal Pain Control and Function  Outcome: Progressing  Goal: Skin Health and Integrity  Outcome: Progressing  Goal: Optimal Wound Healing  Outcome: Progressing

## 2024-11-21 NOTE — SUBJECTIVE & OBJECTIVE
Past Medical History:   Diagnosis Date    Anxiety     Bilateral inguinal hernia     CHF (congestive heart failure)     Coronary artery disease     Gout     Hyperlipidemia     Hypertension     Hyperthyroidism     Ischemic cardiomyopathy     Thyroid nodule        Past Surgical History:   Procedure Laterality Date    CHOLECYSTECTOMY      1990    CORONARY ANGIOPLASTY WITH STENT PLACEMENT      INGUINAL HERNIA REPAIR Left     KNEE ARTHROSCOPY Right     8/10/2018    REPAIR, HERNIA, INGUINAL, LAPAROSCOPIC Right 3/22/2024    Procedure: REPAIR, HERNIA, INGUINAL, LAPAROSCOPIC;  Surgeon: Reggie Miller MD;  Location: [facility] OR;  Service: General;  Laterality: Right;    RIGHT GREAT TOE AMPUTATION         Review of patient's allergies indicates:  No Known Allergies    No current facility-administered medications on file prior to encounter.     Current Outpatient Medications on File Prior to Encounter   Medication Sig    allopurinoL (ZYLOPRIM) 100 MG tablet Take 100 mg by mouth once daily.    atorvastatin (LIPITOR) 40 MG tablet Take 40 mg by mouth once daily.    ENTRESTO  mg per tablet Take 1 tablet by mouth 2 (two) times daily.    furosemide (LASIX) 20 MG tablet Take 20 mg by mouth 2 (two) times daily.    methIMAzole (TAPAZOLE) 5 MG Tab Take 5 mg by mouth once daily.    metoprolol succinate (TOPROL-XL) 50 MG 24 hr tablet Take 50 mg by mouth once daily.     Family History       Problem Relation (Age of Onset)    Cancer Mother    Ovarian cancer Sister    Pancreatic cancer Brother          Tobacco Use    Smoking status: Never    Smokeless tobacco: Never   Substance and Sexual Activity    Alcohol use: Never    Drug use: Never    Sexual activity: Not on file     Review of Systems   Constitutional:  Positive for chills.   HENT: Negative.     Eyes: Negative.    Respiratory: Negative.     Cardiovascular:  Positive for leg swelling.   Gastrointestinal: Negative.    Endocrine: Negative.    Genitourinary: Negative.   "  Musculoskeletal:  Positive for myalgias.   Neurological: Negative.    Hematological: Negative.    Psychiatric/Behavioral: Negative.       Objective:     Vital Signs (Most Recent):  Temp: 98.2 °F (36.8 °C) (11/20/24 2100)  Pulse: 70 (11/20/24 2144)  Resp: (!) 23 (11/20/24 2144)  BP: (!) 153/65 (11/20/24 2144)  SpO2: 100 % (11/20/24 2144) Vital Signs (24h Range):  Temp:  [96.4 °F (35.8 °C)-98.2 °F (36.8 °C)] 98.2 °F (36.8 °C)  Pulse:  [42-75] 70  Resp:  [18-24] 23  SpO2:  [96 %-100 %] 100 %  BP: (108-178)/(41-95) 153/65     Weight: 87.5 kg (193 lb)  Body mass index is 26.18 kg/m².     Physical Exam  Constitutional:       Appearance: Normal appearance. He is normal weight.   HENT:      Mouth/Throat:      Pharynx: Oropharynx is clear.   Eyes:      Extraocular Movements: Extraocular movements intact.      Conjunctiva/sclera: Conjunctivae normal.      Pupils: Pupils are equal, round, and reactive to light.   Cardiovascular:      Rate and Rhythm: Normal rate and regular rhythm.      Pulses: Normal pulses.      Heart sounds: Normal heart sounds.   Pulmonary:      Effort: Pulmonary effort is normal.      Breath sounds: Normal breath sounds.   Abdominal:      General: Abdomen is flat. Bowel sounds are normal.      Palpations: Abdomen is soft.   Musculoskeletal:         General: Normal range of motion.      Right lower leg: Edema present.      Left lower leg: Edema present.   Skin:     General: Skin is warm and dry.   Neurological:      General: No focal deficit present.      Mental Status: He is alert and oriented to person, place, and time. Mental status is at baseline.   Psychiatric:         Mood and Affect: Mood normal.              CRANIAL NERVES     CN III, IV, VI   Pupils are equal, round, and reactive to light.       Significant Labs: All pertinent labs within the past 24 hours have been reviewed.  ABGs: No results for input(s): "PH", "PCO2", "HCO3", "POCSATURATED", "BE", "TOTALHB", "COHB", "METHB", "O2HB", " ""POCFIO2", "PO2" in the last 48 hours.  BMP:   Recent Labs   Lab 11/20/24 1752      K 7.9*   *   CO2 6*   *   CREATININE 2.67*   CALCIUM 9.6   MG 2.00     CBC:   Recent Labs   Lab 11/20/24  1053 11/20/24  1100 11/20/24 1752   WBC  --  8.54 8.01   HGB 10.9* 12.3* 12.5*   HCT  --  37.8 40.3   PLT  --  169 152     CMP:   Recent Labs   Lab 11/20/24  1100 11/20/24 1752    137   K 7.6* 7.9*   * 120*   CO2 7* 6*   * 102*   CREATININE 2.68* 2.67*   CALCIUM 9.7 9.6   ALBUMIN 3.6 3.9   BILITOT 0.6 0.5   ALKPHOS 161* 167*   AST 56 52   * 238*     Lactic Acid: No results for input(s): "LACTATE" in the last 48 hours.  Respiratory Culture: No results for input(s): "GSRESP", "RESPIRATORYC" in the last 48 hours.  Troponin:   Recent Labs   Lab 11/20/24 1752   TROPONINI 0.038*     TSH:   Recent Labs   Lab 11/20/24 1752   TSH 1.970       Significant Imaging:     "

## 2024-11-21 NOTE — NURSING
Spoke with pt wife Brissa to give an update on patient status and plan of care. Allowed time for questions. No complaints regarding patient plan of care verbalized at this time. Brissa informs that she is on the way to visit patient shortly.

## 2024-11-21 NOTE — PROGRESS NOTES
[facility]  [facility]  Progress Note    Patient Name: Luis Felipe Puri  MRN: [id number]  Admission Date: 11/20/2024  Hospital Length of Stay: 1 days  Attending Provider: Maris Nixon MD   Primary Care Physician: Shraddha Cardenas NP  Principal Problem:<principal problem not specified>    Consults  Subjective:     Interval History: follow up for acute kid injury, hyperkalemia, metabolic acidosis  Patient received dialysis yesterday. Lab data looks much better.    Review of patient's allergies indicates:  No Known Allergies  Current Facility-Administered Medications   Medication Frequency    enoxaparin injection 30 mg Q24H (prophylaxis, 1700)    famotidine (PF) injection 20 mg Daily       Objective:     Vital Signs (Most Recent):  Temp: 98.1 °F (36.7 °C) (11/21/24 1101)  Pulse: (!) 55 (11/21/24 1101)  Resp: 15 (11/21/24 1101)  BP: (!) 110/45 (11/21/24 1101)  SpO2: 98 % (11/21/24 1101)   Vital Signs (24h Range):  Temp:  [97.7 °F (36.5 °C)-98.2 °F (36.8 °C)] 98.1 °F (36.7 °C)  Pulse:  [42-85] 55  Resp:  [13-26] 15  SpO2:  [82 %-100 %] 98 %  BP: ()/() 110/45       Weight: 77.6 kg (171 lb 1.2 oz) (11/20/24 2230)  Body mass index is 23.2 kg/m².  Body surface area is 1.99 meters squared.    I/O last 3 completed shifts:  In: 1500 [I.V.:400; IV Piggyback:1100]  Out: 1250 [Urine:1250]      Physical Exam    Significant Labs:sureBMP:   Recent Labs   Lab 11/21/24  0418      K 5.2*   *   CO2 13*   BUN 74*   CREATININE 1.73*   CALCIUM 8.7   MG 1.80     CBC:   Recent Labs   Lab 11/21/24  0545   WBC 8.07   RBC 3.47*   HGB 10.6*   HCT 31.6*   PLT 87*   MCV 91.1   MCH 30.5   MCHC 33.5     CMP:   Recent Labs   Lab 11/21/24  0418   CALCIUM 8.7   ALBUMIN 3.0*      K 5.2*   CO2 13*   *   BUN 74*   CREATININE 1.73*   ALKPHOS 132   *   AST 34   BILITOT 1.0     Significant Imaging:  Labs: Reviewed    Assessment/Plan:     There are no hospital problems to display for this  patient.    A/P  1. Acute kidney injury.  Doing better     2. Hyperkalemia.  Improving     4. Metabolic acidosis.  Better     Consider starting gentle hydration    Thank you for your consult. I will follow-up with patient. Please contact us if you have any additional questions.    Job Lopez MD  Nephrology  [facility]

## 2024-11-21 NOTE — PLAN OF CARE
11/21/24 1236   Discharge Assessment   Assessment Type Discharge Planning Assessment   Confirmed/corrected address, phone number and insurance Yes   Confirmed Demographics Correct on Facesheet   Source of Information patient;family   When was your last doctors appointment? 11/20/24   Communicated JENSEN with patient/caregiver Yes   Reason For Admission Hyperkalemia requiring Hemo-Dialysis, LEO on CKD, Heart Failure, Metabolic Acidosis, Atrial Fibrillation   People in Home spouse   Facility Arrived From: Home   Do you expect to return to your current living situation? Yes   Do you have help at home or someone to help you manage your care at home? Yes   Who are your caregiver(s) and their phone number(s)? Brissa Puri-wife [phone]   Prior to hospitilization cognitive status: Alert/Oriented   Current cognitive status: Alert/Oriented   Walking or Climbing Stairs Difficulty yes   Walking or Climbing Stairs ambulation difficulty, requires equipment   Mobility Management Rolling Walker, Rollator   Dressing/Bathing Difficulty yes   Dressing/Bathing bathing difficulty, requires equipment;bathing difficulty, assistance 1 person;dressing difficulty, assistance 1 person   Dressing/Bathing Management Shower chair, wife assists   Home Accessibility stairs to enter home   Number of Stairs, Main Entrance three   Surface of Stairs, Main Entrance concrete   Equipment Currently Used at Home grab bar;shower chair;rollator;walker, rolling   Readmission within 30 days? No   Patient currently being followed by outpatient case management? No   Do you currently have service(s) that help you manage your care at home? No   Do you take prescription medications? Yes   Do you have prescription coverage? Yes   Coverage Medicare   Do you have any problems affording any of your prescribed medications? No   Is the patient taking medications as prescribed? yes   Who is going to help you get home at discharge? Wife   How do you get to doctors  appointments? family or friend will provide   Are you on dialysis? No   Do you take coumadin? No   Discharge Plan A Hospice/home   Discharge Plan B New Nursing Home placement - [facility] care facility  (with Hospice Care)   DME Needed Upon Discharge  none   Discharge Plan discussed with: Patient;Spouse/sig other   Name(s) and Number(s) Brissa Puri-Wife  [phone]   Transition of Care Barriers None   Physical Activity   On average, how many days per week do you engage in moderate to strenuous exercise (like a brisk walk)? 0 days   On average, how many minutes do you engage in exercise at this level? 0 min   Financial Resource Strain   How hard is it for you to pay for the very basics like food, housing, medical care, and heating? Not very   Housing Stability   In the last 12 months, was there a time when you were not able to pay the mortgage or rent on time? N   At any time in the past 12 months, were you homeless or living in a shelter (including now)? N   Transportation Needs   Has the lack of transportation kept you from medical appointments, meetings, work or from getting things needed for daily living? No   Food Insecurity   Within the past 12 months, you worried that your food would run out before you got the money to buy more. Never true   Within the past 12 months, the food you bought just didn't last and you didn't have money to get more. Never true   Stress   Do you feel stress - tense, restless, nervous, or anxious, or unable to sleep at night because your mind is troubled all the time - these days? Not at all   Social Isolation   How often do you feel lonely or isolated from those around you?  Never   Alcohol Use   Q1: How often do you have a drink containing alcohol? Never   Q3: How often do you have six or more drinks on one occasion? Never   Utilities   In the past 12 months has the electric, gas, oil, or water company threatened to shut off services in your home? No   Health Literacy   How  often do you need to have someone help you when you read instructions, pamphlets, or other written material from your doctor or pharmacy? Sometimes   OTHER   Name(s) of People in Home Brissa Puri-Wife     Patient's wife-Brissa discussed plan of care with .  She has decided to bring patient home with hospice at discharge.  She states she has family and friends to assist her with any needs.  Patient's wife-Brissa given a CCP list of hospice companies near their home.   chose [facility] and signed CCP.

## 2024-11-21 NOTE — HOSPITAL COURSE
11/21/2024  Labs improving s/p HD yesterday  Still very weak  Not much appetite   11/22/2024 brief discharge summary 89-year-old male patient Dr. Meehan heart failure came in acute renal failure got worse.  Hospital course patient was admitted to the hospital he was dialyzed things improve Dr. Hirsch had a long discussion with the patient told him he had a very poor prognosis patient has opted to just go home with hospice his BUN and creatinine and his acidosis has improved.  We will go home on his usual medications with hospice.  Appreciate Dr. Meehan help.

## 2024-11-21 NOTE — NURSING
Sophie from [facility] at bedside discussing hospice care with spouse and patient.

## 2024-11-21 NOTE — PLAN OF CARE
[facility] states patient and wife are agreeable to hospice.  She states equipment will be ordered and delivered to patient's  home in anticipation of discharge.

## 2024-11-21 NOTE — NURSING
Called [facility] to inform staff pt was assigned to 212 and to call when the nurse assigned is ready for report.

## 2024-11-21 NOTE — ED NOTES
Success  A new connect request was successfully created for:  Luis Felipe Puri  : 1935  MRN: [id number]  ConnectID: [id number]  REASON:  Admit: ICU  ACUITY:  10 Minutes  SUBMITTED:  2024 19:10 CST  CLOSE

## 2024-11-21 NOTE — PROGRESS NOTES
[facility] Medicine  Progress Note    Patient Name: Luis Felipe Puri  MRN: [id number]  Patient Class: IP- Inpatient   Admission Date: 11/20/2024  Length of Stay: 1 days  Attending Physician: Maris Nixon MD  Primary Care Provider: Shraddha Cardenas NP        Subjective:     Principal Problem:Acute renal failure superimposed on chronic kidney disease        HPI:  88 yo male with hx Systolic heart failure reduced EF on Entresto, CKD, gout, who was sent to ER due to severe lab abnormality, elevated potassium 7.9, severe metabolic acidosis, in ED, he was give calcium gluconate, Bicarb, insulin with D50, back placed IVF was given, requested emergent dialysis, vasc cath was placed by ED and renal consulted. He is suppose to get HD. Pt C/o not feeling well, no Fever, no Chest pain,has SHABAZZ            Overview/Hospital Course:  11/21/2024  Labs improving s/p HD yesterday  Still very weak  Not much appetite     Past Medical History:   Diagnosis Date    Anxiety     Bilateral inguinal hernia     CHF (congestive heart failure)     Coronary artery disease     Gout     Hyperlipidemia     Hypertension     Hyperthyroidism     Ischemic cardiomyopathy     Thyroid nodule        Past Surgical History:   Procedure Laterality Date    CHOLECYSTECTOMY      1990    CORONARY ANGIOPLASTY WITH STENT PLACEMENT      INGUINAL HERNIA REPAIR Left     KNEE ARTHROSCOPY Right     8/10/2018    REPAIR, HERNIA, INGUINAL, LAPAROSCOPIC Right 3/22/2024    Procedure: REPAIR, HERNIA, INGUINAL, LAPAROSCOPIC;  Surgeon: Reggie Miller MD;  Location: [facility];  Service: General;  Laterality: Right;    RIGHT GREAT TOE AMPUTATION         Review of patient's allergies indicates:  No Known Allergies    No current facility-administered medications on file prior to encounter.     Current Outpatient Medications on File Prior to Encounter   Medication Sig    allopurinoL (ZYLOPRIM) 100 MG tablet Take 100 mg by mouth once daily.     atorvastatin (LIPITOR) 40 MG tablet Take 40 mg by mouth once daily.    ENTRESTO  mg per tablet Take 1 tablet by mouth 2 (two) times daily.    furosemide (LASIX) 20 MG tablet Take 20 mg by mouth 2 (two) times daily.    methIMAzole (TAPAZOLE) 5 MG Tab Take 5 mg by mouth once daily.    metoprolol succinate (TOPROL-XL) 50 MG 24 hr tablet Take 50 mg by mouth once daily.     Family History       Problem Relation (Age of Onset)    Cancer Mother    Ovarian cancer Sister    Pancreatic cancer Brother          Tobacco Use    Smoking status: Never    Smokeless tobacco: Never   Substance and Sexual Activity    Alcohol use: Never    Drug use: Never    Sexual activity: Not on file     Review of Systems   Constitutional:  Positive for chills.   HENT: Negative.     Eyes: Negative.    Respiratory: Negative.     Cardiovascular:  Positive for leg swelling.   Gastrointestinal: Negative.    Endocrine: Negative.    Genitourinary: Negative.    Musculoskeletal:  Positive for myalgias.   Neurological: Negative.    Hematological: Negative.    Psychiatric/Behavioral: Negative.       Objective:     Vital Signs (Most Recent):  Temp: 98.1 °F (36.7 °C) (11/21/24 1101)  Pulse: (!) 55 (11/21/24 1101)  Resp: 15 (11/21/24 1101)  BP: (!) 110/45 (11/21/24 1101)  SpO2: 98 % (11/21/24 1101) Vital Signs (24h Range):  Temp:  [97.7 °F (36.5 °C)-98.2 °F (36.8 °C)] 98.1 °F (36.7 °C)  Pulse:  [42-85] 55  Resp:  [13-26] 15  SpO2:  [82 %-100 %] 98 %  BP: ()/() 110/45     Weight: 77.6 kg (171 lb 1.2 oz)  Body mass index is 23.2 kg/m².     Physical Exam  Constitutional:       Appearance: Normal appearance. He is normal weight.   HENT:      Mouth/Throat:      Pharynx: Oropharynx is clear.   Eyes:      Extraocular Movements: Extraocular movements intact.      Conjunctiva/sclera: Conjunctivae normal.      Pupils: Pupils are equal, round, and reactive to light.   Cardiovascular:      Rate and Rhythm: Normal rate and regular rhythm.      Pulses: Normal  "pulses.      Heart sounds: Normal heart sounds.   Pulmonary:      Effort: Pulmonary effort is normal.      Breath sounds: Normal breath sounds.   Abdominal:      General: Abdomen is flat. Bowel sounds are normal.      Palpations: Abdomen is soft.   Musculoskeletal:         General: Normal range of motion.      Right lower leg: Edema present.      Left lower leg: Edema present.   Skin:     General: Skin is warm and dry.   Neurological:      General: No focal deficit present.      Mental Status: He is alert and oriented to person, place, and time. Mental status is at baseline.   Psychiatric:         Mood and Affect: Mood normal.              CRANIAL NERVES     CN III, IV, VI   Pupils are equal, round, and reactive to light.       Significant Labs: All pertinent labs within the past 24 hours have been reviewed.  ABGs: No results for input(s): "PH", "PCO2", "HCO3", "POCSATURATED", "BE", "TOTALHB", "COHB", "METHB", "O2HB", "POCFIO2", "PO2" in the last 48 hours.  BMP:   Recent Labs   Lab 11/21/24  0418      K 5.2*   *   CO2 13*   BUN 74*   CREATININE 1.73*   CALCIUM 8.7   MG 1.80     CBC:   Recent Labs   Lab 11/20/24  1100 11/20/24  1752 11/21/24  0545   WBC 8.54 8.01 8.07   HGB 12.3* 12.5* 10.6*   HCT 37.8 40.3 31.6*    152 87*     CMP:   Recent Labs   Lab 11/20/24  1100 11/20/24  1752 11/21/24  0418    137 138   K 7.6* 7.9* 5.2*   * 120* 118*   CO2 7* 6* 13*   * 102* 74*   CREATININE 2.68* 2.67* 1.73*   CALCIUM 9.7 9.6 8.7   ALBUMIN 3.6 3.9 3.0*   BILITOT 0.6 0.5 1.0   ALKPHOS 161* 167* 132   AST 56 52 34   * 238* 174*     Lactic Acid: No results for input(s): "LACTATE" in the last 48 hours.  Respiratory Culture: No results for input(s): "GSRESP", "RESPIRATORYC" in the last 48 hours.  Troponin:   Recent Labs   Lab 11/20/24  1752   TROPONINI 0.038*     TSH:   Recent Labs   Lab 11/20/24  1752   TSH 1.970       Significant Imaging:       Assessment/Plan:      * Acute renal " failure superimposed on chronic kidney disease  S/p HD yesterday  Renal fxn improving   Cr down to 1.73    Hyperkalemia  Improved s/p hyperkalemia protocol            VTE Risk Mitigation (From admission, onward)           Ordered     enoxaparin injection 30 mg  Every 24 hours         11/20/24 7490                    Discharge Planning   JENSEN: 11/26/2024     Code Status: DNR   Is the patient medically ready for discharge?:     Reason for patient still in hospital (select all that apply): Patient trending condition, Laboratory test, and Treatment  Discharge Plan A: Hospice/home                  Ralf Ingram MD  Department of Hospital Medicine   [facility]

## 2024-11-21 NOTE — PLAN OF CARE
Problem: Infection  Goal: Absence of Infection Signs and Symptoms  Outcome: Progressing     Problem: Adult Inpatient Plan of Care  Goal: Plan of Care Review  Outcome: Progressing  Goal: Patient-Specific Goal (Individualized)  Outcome: Progressing  Goal: Absence of Hospital-Acquired Illness or Injury  Outcome: Progressing  Goal: Optimal Comfort and Wellbeing  Outcome: Progressing  Goal: Readiness for Transition of Care  Outcome: Progressing     Problem: Fall Injury Risk  Goal: Absence of Fall and Fall-Related Injury  Outcome: Progressing     Problem: Skin Injury Risk Increased  Goal: Skin Health and Integrity  Outcome: Progressing     Problem: Hemodialysis  Goal: Safe, Effective Therapy Delivery  Outcome: Progressing  Goal: Effective Tissue Perfusion  Outcome: Progressing  Goal: Absence of Infection Signs and Symptoms  Outcome: Progressing     Problem: Wound  Goal: Optimal Coping  Outcome: Progressing  Goal: Optimal Functional Ability  Outcome: Progressing  Goal: Absence of Infection Signs and Symptoms  Outcome: Progressing  Goal: Improved Oral Intake  Outcome: Progressing  Goal: Optimal Pain Control and Function  Outcome: Progressing  Goal: Skin Health and Integrity  Outcome: Progressing  Goal: Optimal Wound Healing  Outcome: Progressing     Problem: Acute Kidney Injury/Impairment  Goal: Fluid and Electrolyte Balance  Outcome: Progressing  Goal: Improved Oral Intake  Outcome: Progressing  Goal: Effective Renal Function  Outcome: Progressing

## 2024-11-22 VITALS
TEMPERATURE: 98 F | BODY MASS INDEX: 23.17 KG/M2 | WEIGHT: 171.06 LBS | OXYGEN SATURATION: 96 % | HEIGHT: 72 IN | RESPIRATION RATE: 20 BRPM | SYSTOLIC BLOOD PRESSURE: 110 MMHG | HEART RATE: 74 BPM | DIASTOLIC BLOOD PRESSURE: 62 MMHG

## 2024-11-22 LAB
ANION GAP SERPL CALC-SCNC: 8 MEQ/L (ref 2–13)
BASOPHILS # BLD AUTO: 0.01 X10(3)/MCL (ref 0.01–0.08)
BASOPHILS NFR BLD AUTO: 0.1 % (ref 0.1–1.2)
BUN SERPL-MCNC: 69 MG/DL (ref 7–20)
CALCIUM SERPL-MCNC: 8.8 MG/DL (ref 8.4–10.2)
CHLORIDE SERPL-SCNC: 119 MMOL/L (ref 98–110)
CO2 SERPL-SCNC: 14 MMOL/L (ref 21–32)
CREAT SERPL-MCNC: 1.78 MG/DL (ref 0.66–1.25)
CREAT/UREA NIT SERPL: 39 (ref 12–20)
EOSINOPHIL # BLD AUTO: 0.15 X10(3)/MCL (ref 0.04–0.54)
EOSINOPHIL NFR BLD AUTO: 2.2 % (ref 0.7–7)
ERYTHROCYTE [DISTWIDTH] IN BLOOD BY AUTOMATED COUNT: 14.1 %
GFR SERPLBLD CREATININE-BSD FMLA CKD-EPI: 36 ML/MIN/1.73/M2
GLUCOSE SERPL-MCNC: 143 MG/DL (ref 70–115)
HCT VFR BLD AUTO: 31 % (ref 36–52)
HGB BLD-MCNC: 10.4 G/DL (ref 13–18)
IMM GRANULOCYTES # BLD AUTO: 0.02 X10(3)/MCL (ref 0–0.03)
IMM GRANULOCYTES NFR BLD AUTO: 0.3 % (ref 0–0.5)
LYMPHOCYTES # BLD AUTO: 1.98 X10(3)/MCL (ref 1.32–3.57)
LYMPHOCYTES NFR BLD AUTO: 28.8 % (ref 20–55)
MCH RBC QN AUTO: 30.5 PG (ref 27–34)
MCHC RBC AUTO-ENTMCNC: 33.5 G/DL (ref 31–37)
MCV RBC AUTO: 90.9 FL (ref 79–99)
MONOCYTES # BLD AUTO: 0.86 X10(3)/MCL (ref 0.3–0.82)
MONOCYTES NFR BLD AUTO: 12.5 % (ref 4.7–12.5)
NEUTROPHILS # BLD AUTO: 3.86 X10(3)/MCL (ref 1.78–5.38)
NEUTROPHILS NFR BLD AUTO: 56.1 % (ref 37–73)
NRBC BLD AUTO-RTO: 0 %
PLATELET # BLD AUTO: 100 X10(3)/MCL (ref 140–371)
PMV BLD AUTO: 11.5 FL (ref 9.4–12.4)
POTASSIUM SERPL-SCNC: 5.5 MMOL/L (ref 3.5–5.1)
RBC # BLD AUTO: 3.41 X10(6)/MCL (ref 4–6)
SODIUM SERPL-SCNC: 141 MMOL/L (ref 136–145)
WBC # BLD AUTO: 6.88 X10(3)/MCL (ref 4–11.5)

## 2024-11-22 PROCEDURE — 80048 BASIC METABOLIC PNL TOTAL CA: CPT | Performed by: FAMILY MEDICINE

## 2024-11-22 PROCEDURE — 85025 COMPLETE CBC W/AUTO DIFF WBC: CPT | Performed by: FAMILY MEDICINE

## 2024-11-22 PROCEDURE — 25000003 PHARM REV CODE 250: Performed by: FAMILY MEDICINE

## 2024-11-22 PROCEDURE — 36415 COLL VENOUS BLD VENIPUNCTURE: CPT | Performed by: FAMILY MEDICINE

## 2024-11-22 RX ORDER — FAMOTIDINE 20 MG/1
20 TABLET, FILM COATED ORAL DAILY
Status: DISCONTINUED | OUTPATIENT
Start: 2024-11-22 | End: 2024-11-22 | Stop reason: HOSPADM

## 2024-11-22 RX ADMIN — OXYBUTYNIN CHLORIDE 5 MG: 5 TABLET ORAL at 08:11

## 2024-11-22 RX ADMIN — FAMOTIDINE 20 MG: 20 TABLET, FILM COATED ORAL at 08:11

## 2024-11-22 RX ADMIN — MUPIROCIN: 20 OINTMENT TOPICAL at 08:11

## 2024-11-22 NOTE — DISCHARGE SUMMARY
[facility]/Surg  [facility] Medicine  Discharge Summary      Patient Name: Luis Felipe Puri  MRN: [id number]  GUILLERMO: [id number]  Patient Class: IP- Inpatient  Admission Date: 11/20/2024  Hospital Length of Stay: 2 days  Discharge Date and Time:  11/22/2024 12:13 PM  Attending Physician: Ralf Ingram MD   Discharging Provider: Justino Mccray MD  Primary Care Provider: Shraddha Cardenas NP    Primary Care Team: Networked reference to record PCT     HPI:   88 yo male with hx Systolic heart failure reduced EF on Entresto, CKD, gout, who was sent to ER due to severe lab abnormality, elevated potassium 7.9, severe metabolic acidosis, in ED, he was give calcium gluconate, Bicarb, insulin with D50, back placed IVF was given, requested emergent dialysis, vasc cath was placed by ED and renal consulted. He is suppose to get HD. Pt C/o not feeling well, no Fever, no Chest pain,has SHABAZZ            * No surgery found *      Hospital Course:   11/21/2024  Labs improving s/p HD yesterday  Still very weak  Not much appetite   11/22/2024 brief discharge summary 89-year-old male patient Dr. Meehan heart failure came in acute renal failure got worse.  Hospital course patient was admitted to the hospital he was dialyzed things improve Dr. Hirsch had a long discussion with the patient told him he had a very poor prognosis patient has opted to just go home with hospice his BUN and creatinine and his acidosis has improved.  We will go home on his usual medications with hospice.  Appreciate Dr. Meehan help.     Goals of Care Treatment Preferences:  Code Status: DNR      SDOH Screening:  The patient was screened for utility difficulties, food insecurity, transport difficulties, housing insecurity, and interpersonal safety and there were no concerns identified this admission.     Consults:   Consults (From admission, onward)          Status Ordering Provider     Inpatient consult to Social Work/Case Management  Once         Provider:  (Not yet assigned)    Acknowledged MARCIE PALOMINO     Inpatient consult to Hospitalist  Once        Provider:  (Not yet assigned)    Acknowledged MARCIE PALOMINO            No new Assessment & Plan notes have been filed under this hospital service since the last note was generated.  Service: Hospital Medicine    Final Active Diagnoses:    Diagnosis Date Noted POA    PRINCIPAL PROBLEM:  Acute renal failure superimposed on chronic kidney disease [N17.9, N18.9] 03/21/2024 Yes    Hyperkalemia [E87.5] 11/21/2024 Yes      Problems Resolved During this Admission:       Discharged Condition: good    Disposition: Hospice/Home    Follow Up:   Contact information for after-discharge care       Home Medical Care       [facility] .    Service: Home Hospice  Contact information:   [address] [place] [id number] [phone]                                 Patient Instructions:   No discharge procedures on file.    Significant Diagnostic Studies:     Pending Diagnostic Studies:       None           Medications:  Reconciled Home Medications:      Medication List        CONTINUE taking these medications      allopurinoL 100 MG tablet  Commonly known as: ZYLOPRIM  Take 100 mg by mouth once daily.     atorvastatin 40 MG tablet  Commonly known as: LIPITOR  Take 40 mg by mouth once daily.     ENTRESTO  mg per tablet  Generic drug: sacubitriL-valsartan  Take 1 tablet by mouth 2 (two) times daily.     furosemide 20 MG tablet  Commonly known as: LASIX  Take 20 mg by mouth 2 (two) times daily.     methIMAzole 5 MG Tab  Commonly known as: TAPAZOLE  Take 5 mg by mouth once daily.     metoprolol succinate 50 MG 24 hr tablet  Commonly known as: TOPROL-XL  Take 50 mg by mouth once daily.              Indwelling Lines/Drains at time of discharge:   Lines/Drains/Airways       Central Venous Catheter Line  Duration                  Hemodialysis Catheter 11/20/24 2038 right femoral 1 day               Drain  Duration                  Urethral Catheter 11/20/24 1910 Coude;Non-latex 1 day                  Patient Screened for food insecurity, housing instability, transportation needs, utility difficulties, and interpersonal safety.  No needs identified   Time spent on the discharge of patient: 36 minutes         Justino Mccray MD  Department of Hospital Medicine  [facility]

## 2024-11-22 NOTE — SUBJECTIVE & OBJECTIVE
ROS  Objective:     Vital Signs (Most Recent):  Temp: 98.5 °F (36.9 °C) (11/22/24 0740)  Pulse: 69 (11/22/24 0740)  Resp: 18 (11/22/24 0740)  BP: 117/61 (11/22/24 0740)  SpO2: 97 % (11/22/24 0740) Vital Signs (24h Range):  Temp:  [97.2 °F (36.2 °C)-99.4 °F (37.4 °C)] 98.5 °F (36.9 °C)  Pulse:  [55-82] 69  Resp:  [15-22] 18  SpO2:  [95 %-99 %] 97 %  BP: (104-139)/(45-68) 117/61     Weight: 77.6 kg (171 lb 1.2 oz)  Body mass index is 23.2 kg/m².     SpO2: 97 %         Intake/Output Summary (Last 24 hours) at 11/22/2024 1013  Last data filed at 11/22/2024 0902  Gross per 24 hour   Intake 720 ml   Output 975 ml   Net -255 ml       Lines/Drains/Airways       Central Venous Catheter Line  Duration                  Hemodialysis Catheter 11/20/24 2038 right femoral 1 day              Drain  Duration                  Urethral Catheter 11/20/24 1910 Coude;Non-latex 1 day              Peripheral Intravenous Line  Duration                  Peripheral IV - Single Lumen 11/20/24 1734 20 G Anterior;Left Forearm 1 day                       Physical Exam       Significant Labs: CMP   Recent Labs   Lab 11/20/24  1100 11/20/24  1752 11/21/24  0418 11/22/24  0427    137 138 141   K 7.6* 7.9* 5.2* 5.5*   * 120* 118* 119*   CO2 7* 6* 13* 14*   * 102* 74* 69*   CREATININE 2.68* 2.67* 1.73* 1.78*   CALCIUM 9.7 9.6 8.7 8.8   ALBUMIN 3.6 3.9 3.0*  --    BILITOT 0.6 0.5 1.0  --    ALKPHOS 161* 167* 132  --    AST 56 52 34  --    * 238* 174*  --     and CBC   Recent Labs   Lab 11/20/24  1752 11/21/24  0545 11/22/24  0427   WBC 8.01 8.07 6.88   HGB 12.5* 10.6* 10.4*   HCT 40.3 31.6* 31.0*    87* 100*

## 2024-11-22 NOTE — PLAN OF CARE
Problem: Infection  Goal: Absence of Infection Signs and Symptoms  Outcome: Met     Problem: Adult Inpatient Plan of Care  Goal: Plan of Care Review  Outcome: Met  Goal: Patient-Specific Goal (Individualized)  Outcome: Met  Goal: Absence of Hospital-Acquired Illness or Injury  Outcome: Met  Goal: Optimal Comfort and Wellbeing  Outcome: Met  Goal: Readiness for Transition of Care  Outcome: Met     Problem: Fall Injury Risk  Goal: Absence of Fall and Fall-Related Injury  Outcome: Met     Problem: Skin Injury Risk Increased  Goal: Skin Health and Integrity  Outcome: Met     Problem: Hemodialysis  Goal: Safe, Effective Therapy Delivery  Outcome: Met  Goal: Effective Tissue Perfusion  Outcome: Met  Goal: Absence of Infection Signs and Symptoms  Outcome: Met     Problem: Wound  Goal: Optimal Coping  Outcome: Met  Goal: Optimal Functional Ability  Outcome: Met  Goal: Absence of Infection Signs and Symptoms  Outcome: Met  Goal: Improved Oral Intake  Outcome: Met  Goal: Optimal Pain Control and Function  Outcome: Met  Goal: Skin Health and Integrity  Outcome: Met  Goal: Optimal Wound Healing  Outcome: Met     Problem: Acute Kidney Injury/Impairment  Goal: Fluid and Electrolyte Balance  Outcome: Met  Goal: Improved Oral Intake  Outcome: Met  Goal: Effective Renal Function  Outcome: Met

## 2024-11-22 NOTE — NURSING
Called [facility], spoke with Dagmar to arrange transportation for patient back to their house via ambulance. Was told that transportation would be arranged.

## 2024-11-22 NOTE — SUBJECTIVE & OBJECTIVE
Past Medical History:   Diagnosis Date    Anxiety     Bilateral inguinal hernia     CHF (congestive heart failure)     Coronary artery disease     Gout     Hyperlipidemia     Hypertension     Hyperthyroidism     Ischemic cardiomyopathy     Thyroid nodule        Past Surgical History:   Procedure Laterality Date    CHOLECYSTECTOMY      1990    CORONARY ANGIOPLASTY WITH STENT PLACEMENT      INGUINAL HERNIA REPAIR Left     KNEE ARTHROSCOPY Right     8/10/2018    REPAIR, HERNIA, INGUINAL, LAPAROSCOPIC Right 3/22/2024    Procedure: REPAIR, HERNIA, INGUINAL, LAPAROSCOPIC;  Surgeon: Reggie Miller MD;  Location: [facility] OR;  Service: General;  Laterality: Right;    RIGHT GREAT TOE AMPUTATION         Review of patient's allergies indicates:  No Known Allergies    No current facility-administered medications on file prior to encounter.     Current Outpatient Medications on File Prior to Encounter   Medication Sig    allopurinoL (ZYLOPRIM) 100 MG tablet Take 100 mg by mouth once daily.    atorvastatin (LIPITOR) 40 MG tablet Take 40 mg by mouth once daily.    ENTRESTO  mg per tablet Take 1 tablet by mouth 2 (two) times daily.    furosemide (LASIX) 20 MG tablet Take 20 mg by mouth 2 (two) times daily.    methIMAzole (TAPAZOLE) 5 MG Tab Take 5 mg by mouth once daily.    metoprolol succinate (TOPROL-XL) 50 MG 24 hr tablet Take 50 mg by mouth once daily.     Family History       Problem Relation (Age of Onset)    Cancer Mother    Ovarian cancer Sister    Pancreatic cancer Brother          Tobacco Use    Smoking status: Never    Smokeless tobacco: Never   Substance and Sexual Activity    Alcohol use: Never    Drug use: Never    Sexual activity: Not on file     Review of Systems   Reason unable to perform ROS: Patient is ill.     Objective:     Vital Signs (Most Recent):  Temp: 97.7 °F (36.5 °C) (11/21/24 1946)  Pulse: 73 (11/21/24 1946)  Resp: 20 (11/21/24 1946)  BP: (!) 139/59 (11/21/24 1946)  SpO2: 98 % (11/21/24 1946)  Vital Signs (24h Range):  Temp:  [97.2 °F (36.2 °C)-98.2 °F (36.8 °C)] 97.7 °F (36.5 °C)  Pulse:  [55-85] 73  Resp:  [13-26] 20  SpO2:  [82 %-100 %] 98 %  BP: ()/() 139/59     Weight: 77.6 kg (171 lb 1.2 oz)  Body mass index is 23.2 kg/m².    SpO2: 98 %         Intake/Output Summary (Last 24 hours) at 11/21/2024 2029  Last data filed at 11/21/2024 1102  Gross per 24 hour   Intake 1350 ml   Output 1450 ml   Net -100 ml       Lines/Drains/Airways       Central Venous Catheter Line  Duration                  Hemodialysis Catheter 11/20/24 2038 right femoral <1 day              Drain  Duration                  Urethral Catheter 11/20/24 1910 Coude;Non-latex 1 day              Peripheral Intravenous Line  Duration                  Peripheral IV - Single Lumen 11/20/24 1734 20 G Anterior;Left Forearm 1 day                     Physical Exam  Constitutional:       Appearance: He is ill-appearing.   HENT:      Head: Normocephalic and atraumatic.      Nose: No congestion.      Mouth/Throat:      Mouth: Mucous membranes are dry.   Neck:      Comments: Mild JVD  Cardiovascular:      Rate and Rhythm: Normal rate.      Comments: Soft KARMEN lsb + S4  Pulmonary:      Comments: BS bilateral poor inspiratory effort  Abdominal:      Palpations: Abdomen is soft.   Musculoskeletal:      Right lower leg: No edema.      Left lower leg: No edema.   Skin:     General: Skin is dry.   Neurological:      General: No focal deficit present.   Psychiatric:      Comments: Ill appearance          Significant Labs: CMP   Recent Labs   Lab 11/20/24  1100 11/20/24 1752 11/21/24  0418    137 138   K 7.6* 7.9* 5.2*   * 120* 118*   CO2 7* 6* 13*   * 102* 74*   CREATININE 2.68* 2.67* 1.73*   CALCIUM 9.7 9.6 8.7   ALBUMIN 3.6 3.9 3.0*   BILITOT 0.6 0.5 1.0   ALKPHOS 161* 167* 132   AST 56 52 34   * 238* 174*    and CBC   Recent Labs   Lab 11/20/24  1100 11/20/24  1752 11/21/24  0545   WBC 8.54 8.01 8.07   HGB 12.3*  12.5* 10.6*   HCT 37.8 40.3 31.6*    152 87*

## 2024-11-22 NOTE — PLAN OF CARE
11/22/24 1243   Medicare Message   Important Message from Medicare regarding Discharge Appeal Rights Given to patient/caregiver;Explained to patient/caregiver;Signed/date by patient/caregiver   Date IMM was signed 11/20/24   Time IMM was signed 2042

## 2024-11-22 NOTE — PLAN OF CARE
Physician ordered to discharge patient home with Hospice. [facility] was notified per phone/fax of Physician's discharge orders. [facility] to admit patient to their services today.  Will notify [facility] when [facility] is here to transport patient home.

## 2024-11-22 NOTE — NURSING
Attempted to transfer patient to bedside commode. Pt unable to bear own weight or brace himself for transfer. Placed patient on bedpan instead. Pt also too weak to reposition independently in bed.

## 2024-11-22 NOTE — CONSULTS
[facility]-ICU  Cardiology  Consult Note    Patient Name: Luis Felipe Puri  MRN: [id number]  Admission Date: 11/20/2024  Hospital Length of Stay: 1 days  Code Status: DNR   Attending Provider: Dr Ingram  Consulting Provider: Joel Hirsch MD  Primary Care Physician: Shraddha Cardenas NP  Principal Problem:Acute renal failure superimposed on chronic kidney disease    Patient information was obtained from Patient wife, MD notes.     Consults  Subjective:     88 y/o gentleman, well known to me, personal hx of Chronic CHF,  severe LV dysfunction, CAD prior hx of LCX OM PCI, HTN, CRD, hyperthyroid, presented to ER acute renal failure, hyperkalemia -> refractory IV glucose, insulin , bicarb, calcium gluconate-> required emergent dialysis - ICU admit.     Hyperkalemia  Acute Renal Failure  CRD  Hx of severe LV dysfunction  HTN  CAD - remote LCX, OM PCI  Hx of hyperthyroid  Debility        Past Medical History:   Diagnosis Date    Anxiety     Bilateral inguinal hernia     CHF (congestive heart failure)     Coronary artery disease     Gout     Hyperlipidemia     Hypertension     Hyperthyroidism     Ischemic cardiomyopathy     Thyroid nodule        Past Surgical History:   Procedure Laterality Date    CHOLECYSTECTOMY      1990    CORONARY ANGIOPLASTY WITH STENT PLACEMENT      INGUINAL HERNIA REPAIR Left     KNEE ARTHROSCOPY Right     8/10/2018    REPAIR, HERNIA, INGUINAL, LAPAROSCOPIC Right 3/22/2024    Procedure: REPAIR, HERNIA, INGUINAL, LAPAROSCOPIC;  Surgeon: Reggie Miller MD;  Location: [facility] OR;  Service: General;  Laterality: Right;    RIGHT GREAT TOE AMPUTATION         Review of patient's allergies indicates:  No Known Allergies    No current facility-administered medications on file prior to encounter.     Current Outpatient Medications on File Prior to Encounter   Medication Sig    allopurinoL (ZYLOPRIM) 100 MG tablet Take 100 mg by mouth once daily.    atorvastatin (LIPITOR) 40 MG tablet Take  40 mg by mouth once daily.    ENTRESTO  mg per tablet Take 1 tablet by mouth 2 (two) times daily.    furosemide (LASIX) 20 MG tablet Take 20 mg by mouth 2 (two) times daily.    methIMAzole (TAPAZOLE) 5 MG Tab Take 5 mg by mouth once daily.    metoprolol succinate (TOPROL-XL) 50 MG 24 hr tablet Take 50 mg by mouth once daily.     Family History       Problem Relation (Age of Onset)    Cancer Mother    Ovarian cancer Sister    Pancreatic cancer Brother          Tobacco Use    Smoking status: Never    Smokeless tobacco: Never   Substance and Sexual Activity    Alcohol use: Never    Drug use: Never    Sexual activity: Not on file     Review of Systems   Reason unable to perform ROS: Patient is ill.     Objective:     Vital Signs (Most Recent):  Temp: 97.7 °F (36.5 °C) (11/21/24 1946)  Pulse: 73 (11/21/24 1946)  Resp: 20 (11/21/24 1946)  BP: (!) 139/59 (11/21/24 1946)  SpO2: 98 % (11/21/24 1946) Vital Signs (24h Range):  Temp:  [97.2 °F (36.2 °C)-98.2 °F (36.8 °C)] 97.7 °F (36.5 °C)  Pulse:  [55-85] 73  Resp:  [13-26] 20  SpO2:  [82 %-100 %] 98 %  BP: ()/() 139/59     Weight: 77.6 kg (171 lb 1.2 oz)  Body mass index is 23.2 kg/m².    SpO2: 98 %         Intake/Output Summary (Last 24 hours) at 11/21/2024 2029  Last data filed at 11/21/2024 1102  Gross per 24 hour   Intake 1350 ml   Output 1450 ml   Net -100 ml       Lines/Drains/Airways       Central Venous Catheter Line  Duration                  Hemodialysis Catheter 11/20/24 2038 right femoral <1 day              Drain  Duration                  Urethral Catheter 11/20/24 1910 Coude;Non-latex 1 day              Peripheral Intravenous Line  Duration                  Peripheral IV - Single Lumen 11/20/24 1734 20 G Anterior;Left Forearm 1 day                     Physical Exam  Constitutional:       Appearance: He is ill-appearing.   HENT:      Head: Normocephalic and atraumatic.      Nose: No congestion.      Mouth/Throat:      Mouth: Mucous membranes  are dry.   Neck:      Comments: Mild JVD  Cardiovascular:      Rate and Rhythm: Normal rate.      Comments: Soft KARMEN lsb + S4  Pulmonary:      Comments: BS bilateral poor inspiratory effort  Abdominal:      Palpations: Abdomen is soft.   Musculoskeletal:      Right lower leg: No edema.      Left lower leg: No edema.   Skin:     General: Skin is dry.   Neurological:      General: No focal deficit present.   Psychiatric:      Comments: Ill appearance          Significant Labs: CMP   Recent Labs   Lab 11/20/24  1100 11/20/24  1752 11/21/24  0418    137 138   K 7.6* 7.9* 5.2*   * 120* 118*   CO2 7* 6* 13*   * 102* 74*   CREATININE 2.68* 2.67* 1.73*   CALCIUM 9.7 9.6 8.7   ALBUMIN 3.6 3.9 3.0*   BILITOT 0.6 0.5 1.0   ALKPHOS 161* 167* 132   AST 56 52 34   * 238* 174*    and CBC   Recent Labs   Lab 11/20/24  1100 11/20/24  1752 11/21/24  0545   WBC 8.54 8.01 8.07   HGB 12.3* 12.5* 10.6*   HCT 37.8 40.3 31.6*    152 87*     EKG sinus bradycardia, peaked T wave      Assessment and Plan:     Hyperkalemia  Acute Renal Failure  CRD  Hx of severe LV dysfunction  HTN  CAD - remote LCX, OM PCI  Hx of hyperthyroid  Debility    Discussed at length with wife and grand children- > medical therapy , supportive care, hospice   Patient DNR status    Patient assessment labs EKG xray meds prior echo MD notes reviewed  Family consultation - as above    ICU initial consult   80 minutes    Joel Hirsch MD  Cardiology   [facility]

## 2024-11-22 NOTE — PROGRESS NOTES
[facility]/Surg  Cardiology  Progress Note    Patient Name: Luis Felipe Puri  MRN: [id number]  Admission Date: 11/20/2024  Hospital Length of Stay: 2 days  Code Status: DNR   Attending Physician: Ralf Ingram MD   Primary Care Physician: Shraddha Cardenas NP  Expected Discharge Date: 11/26/2024  Principal Problem:Acute renal failure superimposed on chronic kidney disease    Subjective:       88 y/o gentleman, well known to me, personal hx of Chronic CHF,  severe LV dysfunction, CAD prior hx of LCX OM PCI, HTN, CRD, hyperthyroid, presented to ER acute renal failure, hyperkalemia -> refractory IV glucose, insulin , bicarb, calcium gluconate-> required emergent dialysis - ICU admit.     Post ICU transfer  Family going with Hospice , supportive care     Hyperkalemia  Acute Renal Failure  CRD  Hx of severe LV dysfunction  HTN  CAD - remote LCX, OM PCI  Hx of hyperthyroid  Debility           Objective:     Vital Signs (Most Recent):  Temp: 98.5 °F (36.9 °C) (11/22/24 0740)  Pulse: 69 (11/22/24 0740)  Resp: 18 (11/22/24 0740)  BP: 117/61 (11/22/24 0740)  SpO2: 97 % (11/22/24 0740) Vital Signs (24h Range):  Temp:  [97.2 °F (36.2 °C)-99.4 °F (37.4 °C)] 98.5 °F (36.9 °C)  Pulse:  [55-82] 69  Resp:  [15-22] 18  SpO2:  [95 %-99 %] 97 %  BP: (104-139)/(45-68) 117/61     Weight: 77.6 kg (171 lb 1.2 oz)  Body mass index is 23.2 kg/m².     SpO2: 97 %         Intake/Output Summary (Last 24 hours) at 11/22/2024 1013  Last data filed at 11/22/2024 0902  Gross per 24 hour   Intake 720 ml   Output 975 ml   Net -255 ml              Physical Exam  Constitutional:       Appearance: He is ill-appearing.   HENT:      Head: Normocephalic and atraumatic.      Nose: No congestion.      Mouth/Throat:      Mouth: Mucous membranes are dry.   Neck:      Comments: Mild JVD  Cardiovascular:      Rate and Rhythm: Normal rate.      Comments: Soft KARMEN lsb + S4  Pulmonary:      Comments: BS bilateral poor inspiratory  effort  Abdominal:      Palpations: Abdomen is soft.   Musculoskeletal:      Right lower leg: No edema.      Left lower leg: No edema.   Skin:     General: Skin is dry.   Neurological:      General: No focal deficit present.   Psychiatric:      Comments: Ill appearance          Significant Labs: CMP   Recent Labs   Lab 11/20/24  1100 11/20/24  1752 11/21/24  0418 11/22/24  0427    137 138 141   K 7.6* 7.9* 5.2* 5.5*   * 120* 118* 119*   CO2 7* 6* 13* 14*   * 102* 74* 69*   CREATININE 2.68* 2.67* 1.73* 1.78*   CALCIUM 9.7 9.6 8.7 8.8   ALBUMIN 3.6 3.9 3.0*  --    BILITOT 0.6 0.5 1.0  --    ALKPHOS 161* 167* 132  --    AST 56 52 34  --    * 238* 174*  --     and CBC   Recent Labs   Lab 11/20/24  1752 11/21/24  0545 11/22/24  0427   WBC 8.01 8.07 6.88   HGB 12.5* 10.6* 10.4*   HCT 40.3 31.6* 31.0*    87* 100*         Assessment and Plan:         Hyperkalemia  Acute Renal Failure  CRD  Hx of severe LV dysfunction  HTN  CAD - remote LCX, OM PCI  Hx of hyperthyroid  Debility     Discussed at length with wife and grand children- > medical therapy , supportive care, hospice now  Patient DNR status                  Joel Hirsch MD  Cardiology  [facility]

## 2024-11-22 NOTE — PROGRESS NOTES
Pharmacist Intervention IV to PO Note    Luis Felipe Puri is a 89 y.o. male being treated with IV medication famotidine    Patient Data:    Vital Signs (Most Recent):  Temp: 99.4 °F (37.4 °C) (11/22/24 0340)  Pulse: 82 (11/22/24 0340)  Resp: 20 (11/22/24 0340)  BP: 104/68 (11/22/24 0340)  SpO2: 95 % (11/22/24 0340) Vital Signs (72h Range):  Temp:  [96.4 °F (35.8 °C)-99.4 °F (37.4 °C)]   Pulse:  [42-85]   Resp:  [13-26]   BP: ()/()   SpO2:  [82 %-100 %]      CBC:  Recent Labs   Lab 11/20/24  1752 11/21/24  0545 11/22/24  0427   WBC 8.01 8.07 6.88   RBC 4.18 3.47* 3.41*   HGB 12.5* 10.6* 10.4*   HCT 40.3 31.6* 31.0*    87* 100*   MCV 96.4 91.1 90.9   MCH 29.9 30.5 30.5   MCHC 31.0 33.5 33.5     CMP:     Recent Labs   Lab 11/20/24  1100 11/20/24  1752 11/21/24  0418 11/22/24  0427   CALCIUM 9.7 9.6 8.7 8.8   ALBUMIN 3.6 3.9 3.0*  --     137 138 141   K 7.6* 7.9* 5.2* 5.5*   CO2 7* 6* 13* 14*   * 120* 118* 119*   * 102* 74* 69*   CREATININE 2.68* 2.67* 1.73* 1.78*   ALKPHOS 161* 167* 132  --    * 238* 174*  --    AST 56 52 34  --    BILITOT 0.6 0.5 1.0  --        Dietary Orders:  Diet Orders            Diet diabetic 2000 Calories (up to 75 gm per meal): Diabetic starting at 11/21 0919            Based on the following criteria, this patient qualifies for intravenous to oral conversion:  [x] The patients gastrointestinal tract is functioning (tolerating medications via oral or enteral route for 24 hours and tolerating food or enteral feeds for 24 hours).  [x] The patient is hemodynamically stable for 24 hours (heart rate <100 beats per minute, systolic blood pressure >99 mm Hg, and respiratory rate <20 breaths per minute).  [x] The patient shows clinical improvement (afebrile for at least 24 hours and white blood cell count downtrending or normalized). Additionally, the patient must be non-neutropenic (absolute neutrophil count >500 cells/mm3).  [x] For antimicrobials, the  patient has received IV therapy for at least 24 hours.    IV medication famotidine 20mg IV q24h will be changed to oral medication famotidine 20mg PO q24h    Pharmacist's Name: Analia Gagnon  Pharmacist's Extension: [phone]

## 2024-11-22 NOTE — PLAN OF CARE
11/22/24 1259   Final Note   Assessment Type Final Discharge Note   Anticipated Discharge Disposition [facility]  ([facility])   What phone number can be called within the next 1-3 days to see how you are doing after discharge? [phone]   Hospital Resources/Appts/Education Provided Post-Acute resouces added to AVS   Post-Acute Status   Post-Acute Authorization Hospice   Hospice Status Set-up Complete/Auth obtained   Coverage Medicare   Patient choice form signed by patient/caregiver List with quality metrics by geographic area provided;List from CMS Compare;List from System Post-Acute Care   Discharge Delays None known at this time

## (undated) DEVICE — TROCAR KII SHLD BLDED 11X100MM

## (undated) DEVICE — NDL GRANEE OPEN LOOP GRASPER

## (undated) DEVICE — GLOVE PROTEXIS HYDROGEL SZ6.5

## (undated) DEVICE — SET TUB INSUFFLATION SUC 20L

## (undated) DEVICE — CANNULA VISIPORT 5MM SMTH 11MM

## (undated) DEVICE — DRAPE INCISE IOBAN 2 23X17IN

## (undated) DEVICE — NDL PNEUMO INSUFFLATI 120MM

## (undated) DEVICE — SYR 10CC LUER LOCK

## (undated) DEVICE — BLLN DSCTR LAPSCP KII OVAL

## (undated) DEVICE — DISSECTOR EPIX LAPA 5MMX35CM

## (undated) DEVICE — FILTER LAPAROSCOPIC SMOKE EVAC

## (undated) DEVICE — DRAPE DEVON INSTRUMENT 10X16IN

## (undated) DEVICE — Device

## (undated) DEVICE — TROCAR KII SHLD BLDED 5X100MM

## (undated) DEVICE — SYS LAPAROSCOPIC FIXIATION 30

## (undated) DEVICE — NDL SAFETY 22G X 1.5 ECLIPSE

## (undated) DEVICE — IRRIGATOR HYDRO-SURG PLUS 5MM

## (undated) DEVICE — BLLN SPACEMAKER

## (undated) DEVICE — APPLIER CLIP EPIX UNIV 5X34

## (undated) DEVICE — ADHESIVE DERMABOND ADVANCED

## (undated) DEVICE — SPONGE LAP 18X18 PREWASHED

## (undated) DEVICE — GRASPER MINILAP ALGTR 2.4MM

## (undated) DEVICE — SLEEVE KII ADV FIX 5X100MM

## (undated) DEVICE — NDL INSUFFLATION VERRES 120MM

## (undated) DEVICE — TROCAR ENDOPATH XCEL 5X100MM

## (undated) DEVICE — TROCAR ENDO Z THREAD KII 5X100